# Patient Record
Sex: MALE | Race: WHITE | NOT HISPANIC OR LATINO | Employment: OTHER | URBAN - NONMETROPOLITAN AREA
[De-identification: names, ages, dates, MRNs, and addresses within clinical notes are randomized per-mention and may not be internally consistent; named-entity substitution may affect disease eponyms.]

---

## 2019-10-14 ENCOUNTER — HOSPITAL ENCOUNTER (OUTPATIENT)
Dept: GENERAL RADIOLOGY | Facility: HOSPITAL | Age: 59
Discharge: HOME | End: 2019-10-14
Admitting: INTERNAL MEDICINE

## 2019-10-14 ENCOUNTER — OFFICE VISIT (OUTPATIENT)
Dept: CARDIOLOGY | Facility: CLINIC | Age: 59
End: 2019-10-14

## 2019-10-14 ENCOUNTER — DOCUMENTATION (OUTPATIENT)
Dept: CARDIOLOGY | Facility: CLINIC | Age: 59
End: 2019-10-14

## 2019-10-14 VITALS
HEART RATE: 81 BPM | WEIGHT: 129.6 LBS | HEIGHT: 71 IN | SYSTOLIC BLOOD PRESSURE: 118 MMHG | OXYGEN SATURATION: 98 % | DIASTOLIC BLOOD PRESSURE: 78 MMHG | BODY MASS INDEX: 18.15 KG/M2

## 2019-10-14 DIAGNOSIS — R00.2 PALPITATIONS: ICD-10-CM

## 2019-10-14 DIAGNOSIS — R06.02 SHORTNESS OF BREATH: ICD-10-CM

## 2019-10-14 DIAGNOSIS — R55 SYNCOPE AND COLLAPSE: Primary | ICD-10-CM

## 2019-10-14 DIAGNOSIS — Z72.0 TOBACCO ABUSE: ICD-10-CM

## 2019-10-14 DIAGNOSIS — R07.2 PRECORDIAL PAIN: ICD-10-CM

## 2019-10-14 DIAGNOSIS — Z72.0 TOBACCO ABUSE: Primary | ICD-10-CM

## 2019-10-14 DIAGNOSIS — R93.89 ABNORMAL CXR: ICD-10-CM

## 2019-10-14 PROCEDURE — 99205 OFFICE O/P NEW HI 60 MIN: CPT | Performed by: INTERNAL MEDICINE

## 2019-10-14 PROCEDURE — 71046 X-RAY EXAM CHEST 2 VIEWS: CPT

## 2019-10-14 PROCEDURE — 93000 ELECTROCARDIOGRAM COMPLETE: CPT | Performed by: INTERNAL MEDICINE

## 2019-10-14 RX ORDER — CETIRIZINE HYDROCHLORIDE 10 MG/1
10 TABLET ORAL DAILY
Refills: 0 | COMMUNITY
Start: 2019-10-09

## 2019-10-14 RX ORDER — ALBUTEROL SULFATE 90 UG/1
AEROSOL, METERED RESPIRATORY (INHALATION)
Refills: 99 | COMMUNITY
Start: 2019-10-09

## 2019-10-14 RX ORDER — DEXAMETHASONE 4 MG/1
2 TABLET ORAL 2 TIMES DAILY
Refills: 0 | COMMUNITY
Start: 2019-09-06

## 2019-10-14 RX ORDER — BENZONATATE 100 MG/1
100 CAPSULE ORAL 3 TIMES DAILY PRN
Refills: 99 | COMMUNITY
Start: 2019-10-09 | End: 2020-11-11

## 2019-10-14 RX ORDER — METHOCARBAMOL 500 MG/1
TABLET, FILM COATED ORAL
Refills: 99 | COMMUNITY
Start: 2019-10-09

## 2019-10-14 NOTE — PROGRESS NOTES
[facility] Cardiology  OFFICE CONSULT NOTE    Patient Name: Vernon Nolen  Age/Sex: 59 y.o. male  : 1960  MRN: [id number]      Referring Provider: Viet Trevino MD  [address] [place] [address]        Chief Complaint: Syncope with collapse    History of Present Illness:  Vernon Nolen is a 59 y.o. male who has had multiple episodes of syncope.  The last one was his sister found him on the porch and he thought he probably had been out for 2 or 3 minutes.  He does not remember any prodrome and just said that he was slightly hot.  He had no chest pain at that time.  He has had other episodes when he has been out working.  He denies any seizure activity nor spinning in his head.  He said he would just go out and people would come up to him and he would wake up.  Sometimes it was more in the heat though this last time was not.  He has palpitations but did not feel them at this time.  He had no urine and or bowel incontinence or no seizure activity has been witnessed per his sister and his history.    He also has chest pain.  Describes as left-sided it is more of a dull pain.  It does not radiate.  It happens with exertion goes away with rest.  He has no nausea vomiting or diarrhea pheresis with it.  It only lasts for a couple seconds to up to a minute.  This pain does not necessarily go along with the syncopal episode.  He has baseline shortness of air that really does not get worse.    He does have palpitations but these are short and not necessarily related to his syncope.  They are not related to his chest pain either.  He does sleep on 2 pillows this is for neck comfort.        Last Echo: None    Last Cath: None    Past Medical History:  Past Medical History:   Diagnosis Date   • COPD (chronic obstructive pulmonary disease) (CMS/[facility])        PAST SURGERY History reviewed. No pertinent surgical history.    Home Medications:      (Not in a hospital admission)    Allergies:  Allergies    Allergen Reactions   • Penicillins Swelling        Social History:  Social History     Socioeconomic History   • Marital status:      Spouse name: Not on file   • Number of children: Not on file   • Years of education: Not on file   • Highest education level: Not on file   Tobacco Use   • Smoking status: Current Every Day Smoker     Packs/day: 0.50     Types: Cigarettes   • Smokeless tobacco: Never Used   Substance and Sexual Activity   • Alcohol use: No     Frequency: Never   • Drug use: Defer   • Sexual activity: Defer        Family History:  Family History   Problem Relation Age of Onset   • Heart disease Mother    • Heart disease Father    • Heart disease Sister          Review of Systems:   Constitution: No chills, no rigors, no unexplained weight loss or weight gain    Eyes:  No diplopia, no blurred vision, no loss of vision, conjunctiva is pink and sclera is anicteric    ENT:  No tinnitus, no otorrhea, no epistaxis, no sore throat   Respiratory: No cough, no hemoptysis    Cardiovascular:  As mentioned in HPI    Gastrointestinal: No nausea, no vomiting, no hematemesis, no diarrhea or constipation, no melena    Genitourinary: No frequency of dysuria no hematuria    Integument: positive for  No pruritis and  no skin rash    Hematologic / Lymphatic: No excessive bleeding, easy bruising, fatigue, lymphadenopathy and petechiae    Musculoskeletal: No joint pain, joint stiffness, joint swelling, muscle pain, muscle weakness and neck pain    Neurological: No dizziness, headaches, light headedness, seizures and vertigo or stroke    Behavioral/Psych: No depression, or bipolar disorder    Endocrine: no h/o diabetes, hyperlipidemia or thyroid problems        Vitals:    10/14/19 0828   BP: 118/78   Pulse: 81   SpO2: 98%       Body mass index is 18.08 kg/m².          Physical Exam:   General Appearance:    Alert, oriented, cooperative, in no acute distress     Head:    Normocephalic, atraumatic, without obvious  abnormality     Eyes:            Lids and lashes normal, conjunctivae and sclerae normal, no   icterus, no pallor     Ears:    Ears appear intact with no abnormalities noted     Throat:   Mucous membranes pink and moist     Neck:   Supple, trachea midline, no carotid bruit, no JVD     Lungs:     Air enty equal,  Clear to auscultation, respirations regular, Unlabored. No wheezes, rales, rhonchi    Heart:    Regular rhythm and normal rate, normal S1 and S2, no            murmur, no gallop,      Abdomen:     Normal bowel sounds, no masses, liver and spleen nonpalpable, soft, non-tender, non-distended, no guarding, no rebound tenderness       Extremities:   Moves all extremities well, no edema, no cyanosis, no              Redness, no clubbing     Pulses:   Pulses palpable and equal bilaterally       Neurologic:   Alert and oriented to person, place, and time. No focal neurological deficits       Lab Review:     No results found for any previous visit.   ]    Assessment and Plan  1 syncope and collapse  At this point we will go ahead and do orthostatic blood pressure heart rates and we will also plan on seeing event monitor.  We will do a echo to assess for LV systolic function.    2 chest pain    At this point we will order exercise treadmill test on the patient.  We will also obtain a lipid profile and get laboratories from his primary care physician.  An echo has also been ordered to look for any wall motion abnormalities.    3 palpitations    At this point we will go ahead and put a event monitor on him along with checking the above-mentioned echo.    4 shortness of air    At this point we will check echo at the primarily part if this is secondary to his COPD.  He is being evaluated for   Pulmonary.    5 tobacco abuse    Smoking cessation and counseling over 10 minutes was given.    She has abnormal chest x-ray.  CT of chest with and without contrast has been ordered.

## 2019-10-14 NOTE — PROGRESS NOTES
Orthostatic blood pressures   Laying 100/60  Sitting 110/72  Standing 118/78  Patient tolerated well

## 2019-10-15 ENCOUNTER — DOCUMENTATION (OUTPATIENT)
Dept: CARDIOLOGY | Facility: CLINIC | Age: 59
End: 2019-10-15

## 2019-10-15 ENCOUNTER — TELEPHONE (OUTPATIENT)
Dept: CARDIOLOGY | Facility: CLINIC | Age: 59
End: 2019-10-15

## 2019-10-16 ENCOUNTER — APPOINTMENT (OUTPATIENT)
Dept: CT IMAGING | Facility: HOSPITAL | Age: 59
End: 2019-10-16

## 2019-10-18 ENCOUNTER — HOSPITAL ENCOUNTER (OUTPATIENT)
Dept: CT IMAGING | Facility: HOSPITAL | Age: 59
End: 2019-10-18

## 2019-10-22 ENCOUNTER — APPOINTMENT (OUTPATIENT)
Dept: CT IMAGING | Facility: HOSPITAL | Age: 59
End: 2019-10-22

## 2019-10-23 ENCOUNTER — TELEPHONE (OUTPATIENT)
Dept: GENERAL RADIOLOGY | Facility: HOSPITAL | Age: 59
End: 2019-10-23

## 2019-10-23 ENCOUNTER — APPOINTMENT (OUTPATIENT)
Dept: CT IMAGING | Facility: HOSPITAL | Age: 59
End: 2019-10-23

## 2019-11-05 ENCOUNTER — TELEPHONE (OUTPATIENT)
Dept: CARDIOLOGY | Facility: CLINIC | Age: 59
End: 2019-11-05

## 2019-11-05 NOTE — TELEPHONE ENCOUNTER
Tried to call patient to give results of Holter. Number went straight to message saying patient is unavailable. No voice mail to leave message.

## 2020-06-10 ENCOUNTER — APPOINTMENT (OUTPATIENT)
Dept: ONCOLOGY | Facility: HOSPITAL | Age: 60
End: 2020-06-10

## 2020-06-10 ENCOUNTER — APPOINTMENT (OUTPATIENT)
Dept: ONCOLOGY | Facility: CLINIC | Age: 60
End: 2020-06-10

## 2020-06-12 ENCOUNTER — CONSULT (OUTPATIENT)
Dept: ONCOLOGY | Facility: CLINIC | Age: 60
End: 2020-06-12

## 2020-06-12 ENCOUNTER — LAB (OUTPATIENT)
Dept: ONCOLOGY | Facility: HOSPITAL | Age: 60
End: 2020-06-12

## 2020-06-12 ENCOUNTER — DOCUMENTATION (OUTPATIENT)
Dept: NUTRITION | Facility: HOSPITAL | Age: 60
End: 2020-06-12

## 2020-06-12 VITALS
WEIGHT: 129.2 LBS | DIASTOLIC BLOOD PRESSURE: 85 MMHG | HEART RATE: 91 BPM | TEMPERATURE: 98 F | BODY MASS INDEX: 18.09 KG/M2 | RESPIRATION RATE: 18 BRPM | SYSTOLIC BLOOD PRESSURE: 132 MMHG | HEIGHT: 71 IN

## 2020-06-12 DIAGNOSIS — Z85.828 HISTORY OF SKIN CANCER: ICD-10-CM

## 2020-06-12 DIAGNOSIS — C34.90 MALIGNANT NEOPLASM OF LUNG, UNSPECIFIED LATERALITY, UNSPECIFIED PART OF LUNG (HCC): ICD-10-CM

## 2020-06-12 DIAGNOSIS — R63.4 UNINTENTIONAL WEIGHT LOSS: ICD-10-CM

## 2020-06-12 DIAGNOSIS — C34.90 MALIGNANT NEOPLASM OF LUNG, UNSPECIFIED LATERALITY, UNSPECIFIED PART OF LUNG (HCC): Primary | ICD-10-CM

## 2020-06-12 DIAGNOSIS — J43.1 PANLOBULAR EMPHYSEMA (HCC): ICD-10-CM

## 2020-06-12 DIAGNOSIS — Z71.6 ENCOUNTER FOR TOBACCO USE CESSATION COUNSELING: ICD-10-CM

## 2020-06-12 DIAGNOSIS — Z74.8 ASSISTANCE WITH TRANSPORTATION: ICD-10-CM

## 2020-06-12 LAB
ALBUMIN SERPL-MCNC: 4.2 G/DL (ref 3.5–5.2)
ALBUMIN/GLOB SERPL: 1.7 G/DL
ALP SERPL-CCNC: 107 U/L (ref 39–117)
ALT SERPL W P-5'-P-CCNC: 10 U/L (ref 1–41)
ANION GAP SERPL CALCULATED.3IONS-SCNC: 6 MMOL/L (ref 5–15)
AST SERPL-CCNC: 13 U/L (ref 1–40)
BASOPHILS # BLD AUTO: 0.1 10*3/MM3 (ref 0–0.2)
BASOPHILS NFR BLD AUTO: 1.5 % (ref 0–1.5)
BILIRUB SERPL-MCNC: 0.4 MG/DL (ref 0.2–1.2)
BUN BLD-MCNC: 17 MG/DL (ref 8–23)
BUN/CREAT SERPL: 21.3 (ref 7–25)
CALCIUM SPEC-SCNC: 9.4 MG/DL (ref 8.6–10.5)
CHLORIDE SERPL-SCNC: 102 MMOL/L (ref 98–107)
CO2 SERPL-SCNC: 30 MMOL/L (ref 22–29)
CREAT BLD-MCNC: 0.8 MG/DL (ref 0.76–1.27)
DEPRECATED RDW RBC AUTO: 44.9 FL (ref 37–54)
EOSINOPHIL # BLD AUTO: 0 10*3/MM3 (ref 0–0.4)
EOSINOPHIL NFR BLD AUTO: 0 % (ref 0.3–6.2)
ERYTHROCYTE [DISTWIDTH] IN BLOOD BY AUTOMATED COUNT: 14.1 % (ref 12.3–15.4)
GFR SERPL CREATININE-BSD FRML MDRD: 98 ML/MIN/1.73
GLOBULIN UR ELPH-MCNC: 2.5 GM/DL
GLUCOSE BLD-MCNC: 76 MG/DL (ref 65–99)
HCT VFR BLD AUTO: 50.7 % (ref 37.5–51)
HGB BLD-MCNC: 17 G/DL (ref 13–17.7)
HOLD SPECIMEN: NORMAL
IMM GRANULOCYTES # BLD AUTO: 0.01 10*3/MM3 (ref 0–0.05)
IMM GRANULOCYTES NFR BLD AUTO: 0.2 % (ref 0–0.5)
LYMPHOCYTES # BLD AUTO: 1.91 10*3/MM3 (ref 0.7–3.1)
LYMPHOCYTES NFR BLD AUTO: 28.8 % (ref 19.6–45.3)
MCH RBC QN AUTO: 29 PG (ref 26.6–33)
MCHC RBC AUTO-ENTMCNC: 33.5 G/DL (ref 31.5–35.7)
MCV RBC AUTO: 86.5 FL (ref 79–97)
MONOCYTES # BLD AUTO: 0.47 10*3/MM3 (ref 0.1–0.9)
MONOCYTES NFR BLD AUTO: 7.1 % (ref 5–12)
NEUTROPHILS # BLD AUTO: 4.15 10*3/MM3 (ref 1.7–7)
NEUTROPHILS NFR BLD AUTO: 62.4 % (ref 42.7–76)
NRBC BLD AUTO-RTO: 0 /100 WBC (ref 0–0.2)
PLATELET # BLD AUTO: 231 10*3/MM3 (ref 140–450)
PMV BLD AUTO: 9.7 FL (ref 6–12)
POTASSIUM BLD-SCNC: 4.7 MMOL/L (ref 3.5–5.2)
PROT SERPL-MCNC: 6.7 G/DL (ref 6–8.5)
RBC # BLD AUTO: 5.86 10*6/MM3 (ref 4.14–5.8)
SODIUM BLD-SCNC: 138 MMOL/L (ref 136–145)
WBC NRBC COR # BLD: 6.64 10*3/MM3 (ref 3.4–10.8)

## 2020-06-12 PROCEDURE — 99406 BEHAV CHNG SMOKING 3-10 MIN: CPT | Performed by: INTERNAL MEDICINE

## 2020-06-12 PROCEDURE — 99205 OFFICE O/P NEW HI 60 MIN: CPT | Performed by: INTERNAL MEDICINE

## 2020-06-12 PROCEDURE — G0463 HOSPITAL OUTPT CLINIC VISIT: HCPCS | Performed by: INTERNAL MEDICINE

## 2020-06-12 PROCEDURE — 85025 COMPLETE CBC W/AUTO DIFF WBC: CPT

## 2020-06-12 PROCEDURE — 80053 COMPREHEN METABOLIC PANEL: CPT

## 2020-06-12 NOTE — PROGRESS NOTES
"Adult Outpatient Nutrition  Assessment    Patient Name:  Vernon Nolen  YOB: 1960  MRN: [id number]    Assessment Date:  6/12/2020    CHART REVIEW  Vernon Nolen  1960  59 y.o.  Wt: 129.2 lb  BMI: 18   Dx: lung cancer  Tx: work-up in progress  Labs: not avail     PATIENT/FAMILY COMMENTS  Usual Body Weight: 160's 5 years ago--stable for past year  Weight Comments: wants to gain  Diet: regular   Food Allergies: nkfa  Appetite: good  Supplements: willing to try  Meals: 1-2 + snacks  Food Preparation: pt + sister  Nutrition Concerns:  * problems chewing (lost dentures)  * low bmi  * \"borderling diabetic\" stated sister  * limited income    No problems with swallowing/nausea/vomiting/  constipation/diarrhea.      GOAL(s)  Optimize nutrition in attempt to prevent further loss of LBM      EDUCATION  -High calorie high protein soft moist diet  -Supplementation        * Provided samples of commercial supplements--recipes          for homemade supplements.  -Importance of staying hydrated      To reinforce education, written information with RD's name & number provided.  Pt and sister receptive to suggestions, and agreed to call on dietitian as needed.                          Electronically signed by:  Mallika Caldwell RD  06/12/20 12:00   "

## 2020-06-12 NOTE — PROGRESS NOTES
Vernon Nolen  [id number]  1960      REASON FOR CONSULTATION:  Lung cancer   Provide an opinion on any further workup or treatment                             REQUESTING PHYSICIAN:  Fuentes Davenport MD     RECORDS OBTAINED:  Records of the patients history including those obtained from the referring provider were reviewed and summarized in detail.    History of Present Illness     This is a pleasant 59-year-old male who was seen in consultation at the request of Dr Davenport for evaluation of newly diagnosed lung cancer.  Patient unfortunately is a poor historian and most of the history was obtained by reviewing old medical records.  In addition, I do not have some records available from his pulmonary physician's office.  I will try to obtain additional records to clarify.     Patient had a screening low-dose CT scan of chest was performed as a part of lung cancer screening which showed 1.8 cm left lower lobe mass located in periphery.  Patient subsequently had CT-guided biopsy performed on May 15, 2020 which showed adenocarcinoma.  Patient subsequently underwent PET scan on May 21, 2020.  I do not have actual images available however the radiologist reported to pulmonary nodules.  One was located in left lower lobe measuring 2 cm.  This is the area that was biopsied.  Another tiny small 8 mm nodule was also noted in the right lower lobe.  He was unclear this were 2 separate primaries versus metastatic disease.  No evidence of mediastinal adenopathy or distant metastasis.  Patient has chronic cough and shortness of breath but otherwise is asymptomatic.  His pulmonary physician did not feel that he was surgical candidate given poor lung function.  He was referred to me for further evaluation.    Past Medical History:   Diagnosis Date   • COPD (chronic obstructive pulmonary disease) (CMS/HCC)    • Kidney stones    • Lung cancer (CMS/HCC)         Past Surgical History:   Procedure Laterality Date   • BRONCHOSCOPY           Current Outpatient Medications on File Prior to Visit   Medication Sig Dispense Refill   • albuterol sulfate  (90 Base) MCG/ACT inhaler INHALE 1-2 PUFFS BY MOUTH EVERY FOUR TO SIX HOURS AS NEEDED FOR COPD  99   • benzonatate (TESSALON) 100 MG capsule Take 100 mg by mouth 3 (Three) Times a Day As Needed.  99   • cetirizine (zyrTEC) 10 MG tablet Take 10 mg by mouth Daily.  0   • FLOVENT  MCG/ACT inhaler Inhale 2 puffs 2 (Two) Times a Day.  0   • methocarbamol (ROBAXIN) 500 MG tablet TAKE 1 TO 2 TABLETS BY MOUTH THREE TIMES DAILY AS NEEDED  99     No current facility-administered medications on file prior to visit.         ALLERGIES:    Allergies   Allergen Reactions   • Penicillins Swelling        Social History     Socioeconomic History   • Marital status:      Spouse name: Not on file   • Number of children: Not on file   • Years of education: Not on file   • Highest education level: Not on file   Tobacco Use   • Smoking status: Current Every Day Smoker     Packs/day: 0.50     Types: Cigarettes   • Smokeless tobacco: Never Used   • Tobacco comment: trying to quit   Substance and Sexual Activity   • Alcohol use: No     Frequency: Never   • Drug use: Defer   • Sexual activity: Defer        Family History   Problem Relation Age of Onset   • Heart disease Mother    • Heart disease Father    • Prostate cancer Father    • Heart disease Sister    • Prostate cancer Brother    • Lymphoma Maternal Aunt    • Lymphoma Maternal Uncle    • Breast cancer Paternal Aunt    • Brain cancer Paternal Aunt    • Colon cancer Paternal Grandfather         Review of Systems       CONSTITUTIONAL: fatigue + unintentional weight loss - 20 lbs over a year. No  fever, chills, weakness.  HEENT: Eyes: No visual loss, blurred vision, double vision or yellow sclerae. Ears, Nose, Throat: No hearing loss, sneezing, congestion, runny nose or sore throat.  SKIN: No rash or itching.  CARDIOVASCULAR: No chest pain, chest pressure  "or chest discomfort. No palpitations or edema.  RESPIRATORY: Chronic cough and shortness of breath +   GASTROINTESTINAL: No anorexia, nausea, vomiting or diarrhea. No abdominal pain or blood.  GENITOURINARY: Negative for urgency, frequency or dysuria.   NEUROLOGICAL: No headache, dizziness, syncope, paralysis, ataxia, numbness or tingling in the extremities. No change in bowel or bladder control.  MUSCULOSKELETAL: No muscle, back pain, joint pain or stiffness.  HEMATOLOGIC: No anemia, bleeding or bruising.  LYMPHATICS: No enlarged nodes. No history of splenectomy.  PSYCHIATRIC: No history of depression or anxiety.  ENDOCRINOLOGIC: No reports of sweating, cold or heat intolerance. No polyuria or polydipsia.  ALLERGIES: No history of asthma, hives, eczema or rhinitis.      Objective     Vitals:    06/12/20 0914 06/12/20 1237   BP: 132/85    Pulse: 91    Resp: 18    Temp: 98 °F (36.7 °C)    Weight: 58.6 kg (129 lb 3.2 oz)    Height: 180.3 cm (71\")    PainSc: Comment: uncomfortable, no pain 0-No pain     Current Status 6/12/2020   ECOG score 0       Physical Exam      GENERAL: Alert, awake, oriented.  Well dressed.  Not in apparent distress. Vitals as above.   HEAD: Normocephalic, atraumatic.   EYES: PERRL, EOMI.  vision is grossly intact.  NECK: Supple, no adenopathy or thyromegaly.   THROAT: Normal oral cavity and pharynx. No inflammation, swelling, exudate, or lesions.  CARDIAC: Normal S1 and S2. No S3, S4 or murmurs. Rhythm is regular.  Extremities are warm and well perfused.   LUNGS: Clear to auscultation and percussion without rales, rhonchi, wheezing or diminished breath sounds.  ABDOMEN: Positive bowel sounds. Soft, nondistended, nontender. No guarding or rebound. No masses.  BACK:  No bony tenderness.   EXTREMITIES: No significant deformity or joint abnormality.  Peripheral pulses intact. No varicosities.  SKIN: No rash or bruising.  NEUROLOGICAL: Grossly non-focal exam. No focal weakness. Gait: Normal. "   PSYCH: Mood and affect normal. No hallucination or suicidal thoughts.   LYMPHATIC: No cervical, supraclavicular or axillary adenopathy.       RECENT LABS:Independently reviewed and summarized.  Hematology WBC   Date Value Ref Range Status   06/12/2020 6.64 3.40 - 10.80 10*3/mm3 Final     RBC   Date Value Ref Range Status   06/12/2020 5.86 (H) 4.14 - 5.80 10*6/mm3 Final     Hemoglobin   Date Value Ref Range Status   06/12/2020 17.0 13.0 - 17.7 g/dL Final     Hematocrit   Date Value Ref Range Status   06/12/2020 50.7 37.5 - 51.0 % Final     Platelets   Date Value Ref Range Status   06/12/2020 231 140 - 450 10*3/mm3 Final        Imaging (independently reviewed and summarized):   Result of PET scan from May 21, 20/20 reviewed.  These show FDG avid nodules in both lower lobes.  The largest nodule measured 2 cm in maximum diameter in the left lower lobe.  In addition there was a smaller nodule in the right lower lobe measuring 8 mm.  No evidence of mediastinal or other adenopathy.  No evidence of distant metastasis.  No prior comparison available.    Pathology (result reviewed):   Left lung mass , CT guided biopsy (5/15/20)  Malignant cells consistent with adenocarcinoma       I have reviewed old records and summarized them in HPI as well as assessment and plan section of this note.     In addition, I have requested old PFT records from his pulmonary physician's office.       Vernon Nolen reports a pain score of 0.  Given his pain assessment as noted, treatment options were discussed and the following options were decided upon as a follow-up plan to address the patient's pain: continuation of current treatment plan for pain.    PHQ-9 Total Score: 0  Depression screen negative.     Advance Care Planning   ACP discussion was declined by the patient. Patient does not have an advance directive, declines further assistance.      Diagnosis:   (1) Adenocarcinoma of lung, left lower lobe   (2) Left lower lobe pulmonary nodule    (3) COPD   (4) History of skin cancer  (5) Unintentional weight loss  (6) Encounter for tobacco use cessation counseling     All are new diagnosis for me.   In addition diagnosis of lung cancer potentially life threatening.     Assessment/Plan     (1) Adenocarcinoma of lung, left lower lobe (2) Left lower lobe pulmonary nodule     Discussed diagnosis, prognosis and treatment options at length with patient and family.  He has adenocarcinoma involving left lung measuring 2 cm in maximum diameter.  I do not have actual PET scan images available however radiologist report did not show any concern for metastatic disease.  He did have separate small 8 mm nodule in the right lower lobe.  This could be infectious versus inflammatory or malignant in nature.  It is possible that this is separate primary versus the metastatic disease from the left lung.    I have requested pulmonary function test result from his pulmonary physician's office.  As per him due to his advanced COPD he was not thought to be surgical candidate.  He is otherwise healthy 59-year-old male and I believe could potentially benefit from SBRT treatment to at least left lung nodule which is measuring 2 cm.    I will send referral to [place] to see 1 of the radiation oncologist down there.      (3) COPD: Follows with pulmonary.  Counseled about smoking cessation. On breo and albuterol.     (4) History of skin cancer: Patient is reporting couple of new spots on the skin.  I will arrange referral to dermatology to have them looked at.  He does have history of couple of squamous cell carcinoma of skin that was removed surgically.    (5) Unintentional weight loss: Nutrition consult.  High-calorie high-protein diet.    (6) Encounter for tobacco use cessation counseling: I had 5-7 minutes discussion with the patient about smoking cessation. Problems with continued smoking including respiratory, cardiovascular and oncological problems were discussion. Advice on  smoking cessation was reiterated including methods of quitting and different medications and support system available for smoking cessation was discussed.  Patient understood and was agreeable.    (7) Assistance with transportation:  consult.     I have spent > 60 minutes times face to face with the patient and more than 50% of time was spent on counseling/coordinating care.     Thank you for involving me in Mr Nolen's care.     Please call us if any questions/concerns.     Daniel Guevara MD   Hematology Oncology

## 2020-06-16 ENCOUNTER — TELEPHONE (OUTPATIENT)
Dept: ONCOLOGY | Facility: CLINIC | Age: 60
End: 2020-06-16

## 2020-06-16 NOTE — TELEPHONE ENCOUNTER
MARTITA FROM [facility] CALLED TO LET THE OFFICE KNOW THEY DO NOT TAKE THE PT'S INSURANCE , SHE SAID THAT SHE BELIEVES  DR MICHAEL JUDD IN [place] TAKES MEDICAID IF YOU WANTED TO REFER THE PT THERE

## 2020-06-18 ENCOUNTER — TELEPHONE (OUTPATIENT)
Dept: ONCOLOGY | Facility: CLINIC | Age: 60
End: 2020-06-18

## 2020-06-18 ENCOUNTER — HOSPITAL ENCOUNTER (OUTPATIENT)
Dept: RADIATION ONCOLOGY | Facility: HOSPITAL | Age: 60
Setting detail: RADIATION/ONCOLOGY SERIES
End: 2020-06-18

## 2020-06-18 PROBLEM — F17.200 CURRENT EVERY DAY SMOKER: Status: ACTIVE | Noted: 2020-06-18

## 2020-06-18 NOTE — PROGRESS NOTES
[facility]  MD Nicci Black BSN, PA-C  ____________________________________________________________               [facility]  Department of Radiation Oncology  [address] [place] [id number]  Office:  [phone]  Fax: [phone]    DATE:  06/19/2020  PATIENT: Vernon Nolen  1960                                 MEDICAL RECORD #: [id number]                                                       REASON FOR CONSULTATION:  Vernon Nolen is a very pleasant 60 y.o. male that has been referred to our clinic by Josr Guevara, to discuss radiotherapy recommendations for carcinoma of the lung. Reports appetite change, fatigue, unexpected weight change, seasonal allergies, SOB, wheezing, upper back/shoulder pain, and headaches. Denies cough, chest tightness, stridor, nausea/vomiting, diarrhea, light-headedness, weakness, dizziness, seizures, and speech difficulty. He follows Daniel Guevara MD with Hematology/Oncology.            HISTORY OF PRESENT ILLNESS  Patient had a screening low-dose CT scan of chest that was performed as a part of lung cancer screening which showed a 1.8 cm left lower lobe mass located in periphery.  Biopsy was completed.     05/07/2020 - Left lung, mass, CT guided biopsy:  · Malignant cells are present consistent with adenocarcinoma.     05/21/2020 - PET Scan:  · FDG-avid nodules in both lower lobes, the larger on the left. The nodule at the left lung base is consistent with the known history of lung carcinoma.   · The nodule at the right lung base likely represents a small metastatic focus.   · A second primary lesion could have a similar appearance but would be less likely. Correlation and continued follow-up are recommended.     06/12/2020 - Appointment with Daniel Guevara MD (Hematology Oncology):  Adenocarcinoma of lung, left lower lobe (2) Left lower lobe pulmonary nodule   · Discussed diagnosis, prognosis and treatment  options at length with patient and family.  · He has adenocarcinoma involving left lung measuring 2 cm in maximum diameter.  · I do not have actual PET scan images available however radiologist report did not show any concern for metastatic disease.  · He did have separate small 8 mm nodule in the right lower lobe.  · This could be infectious versus inflammatory or malignant in nature.  · It is possible that this is separate primary versus the metastatic disease from the left lung.  · I have requested pulmonary function test result from his pulmonary physician's office.  · As per him due to his advanced COPD he was not thought to be surgical candidate.  · He is otherwise healthy 59-year-old male and I believe could potentially benefit from SBRT treatment to at least left lung nodule which is measuring 2 cm.  · I will send referral to [place] to see 1 of the radiation oncologist down there.       History obtained from  PATIENT and CHART    PAST MEDICAL HISTORY  Past Medical History:   Diagnosis Date   • COPD (chronic obstructive pulmonary disease) (CMS/HCC)    • Kidney stones    • Lung cancer (CMS/HCC)       PAST SURGICAL HISTORY  Past Surgical History:   Procedure Laterality Date   • BRONCHOSCOPY        FAMILY HISTORY  family history includes Brain cancer in his paternal aunt; Breast cancer in his paternal aunt; Colon cancer in his paternal grandfather; Heart disease in his father, mother, and sister; Lymphoma in his maternal aunt and maternal uncle; Prostate cancer in his brother and father.     SOCIAL HISTORY  Social History     Tobacco Use   • Smoking status: Current Every Day Smoker     Packs/day: 0.50     Years: 53.00     Pack years: 26.50     Types: Cigarettes   • Smokeless tobacco: Never Used   • Tobacco comment: trying to quit   Substance Use Topics   • Alcohol use: No     Frequency: Never   • Drug use: Defer      ALLERGIES  Penicillins     MEDICATIONS  Current Outpatient Medications   Medication Sig  Dispense Refill   • albuterol sulfate  (90 Base) MCG/ACT inhaler INHALE 1-2 PUFFS BY MOUTH EVERY FOUR TO SIX HOURS AS NEEDED FOR COPD  99   • benzonatate (TESSALON) 100 MG capsule Take 100 mg by mouth 3 (Three) Times a Day As Needed.  99   • cetirizine (zyrTEC) 10 MG tablet Take 10 mg by mouth Daily.  0   • FLOVENT  MCG/ACT inhaler Inhale 2 puffs 2 (Two) Times a Day.  0   • Fluticasone Furoate-Vilanterol (Breo Ellipta) 200-25 MCG/INH inhaler Inhale 1 puff Daily.     • methocarbamol (ROBAXIN) 500 MG tablet TAKE 1 TO 2 TABLETS BY MOUTH THREE TIMES DAILY AS NEEDED  99   • Nebulizers (INNOSPIRE ESSENCE NEBULIZER) misc Inhale 4 (Four) Times a Day.     • Tiotropium Bromide Monohydrate (Spiriva Respimat) 1.25 MCG/ACT aerosol solution inhaler Inhale 1 puff Daily.       No current facility-administered medications for this visit.       Current outpatient and discharge medications have been reconciled for the patient.  Reviewed by: Fly Chilel III, MD    The following portions of the patient's history were reviewed and updated as appropriate: allergies, current medications, past family history, past medical history, past social history, past surgical history and problem list.    REVIEW OF SYSTEMS  Review of Systems   Constitutional: Positive for appetite change, fatigue and unexpected weight change. Negative for chills, diaphoresis and fever.   HENT:        Seasonal allergies   Eyes: Negative.    Respiratory: Positive for shortness of breath and wheezing. Negative for apnea, cough, choking, chest tightness and stridor.    Cardiovascular: Negative.    Gastrointestinal: Negative.    Endocrine: Negative.    Genitourinary: Negative.    Musculoskeletal: Negative.         Upper back/shoulders pain   Skin: Negative.    Allergic/Immunologic: Negative.    Neurological: Positive for headaches. Negative for dizziness, tremors, seizures, syncope, facial asymmetry, speech difficulty, weakness, light-headedness and  "numbness.   Hematological: Negative.    Psychiatric/Behavioral: Negative.      PHYSICAL EXAM  VITAL SIGNS:   Vitals:    06/19/20 1234   BP: 118/72   Pulse: 100   Resp: 20   SpO2: 96%   Weight: 58.5 kg (129 lb)   Height: 180.3 cm (71\")   PainSc:   6   PainLoc: Back      General:  Alert and oriented, no acute distress, well developed, Vitals reviewed.  Head:  Normocephalic, without obvious abnormality    Nose/Sinuses:  Nares normal externally, no sinus tenderness.  Mouth/Throat:  Mucosa moist, pharynx without erythema  Neck:  supple, No evidence of adenopathy in the cervical or supraclavicular areas.  Eyes: No gross abnormalities   Ears: Ears intact with no external abnormalities noted  Chest:  Respiratory efforts are normal and unlabored, chest is clear to auscultation.  Cardiovascular: Regular rate and rhythm without murmurs, rubs, or gallops.   Abdomen:  Soft, non-tender, normal bowel sounds; no CVA tenderness   Extremities:  MARQUEZ well, warm to touch, no evidence of cyanosis or edema.  Skin: No suspicious lesions or rashes of concern  Neurologic:  Alert and oriented, non focal exam, strength and sensation grossly normal  Psych: Mood and affect are appropriate    Performance Status: ECOG (1) Restricted in physically strenuous activity, ambulatory and able to do work of light nature    Clinical Quality Measures  -Pain Documented by Standardized Tool, FPS Vernon Nolen reports a pain score of 6. Given his pain assessment as noted, treatment options were discussed and the following options were decided upon as a follow-up plan to address the patient's pain: continuation of current treatment plan for pain and use of non-medical modalities (ice, heat, stretching and/or behavior modifications).    Pain Medications             methocarbamol (ROBAXIN) 500 MG tablet TAKE 1 TO 2 TABLETS BY MOUTH THREE TIMES DAILY AS NEEDED        -Advanced Care Planning Advance Care Planning   ACP discussion was held with the patient during this " visit. Patient does not have an advance directive, information provided.    -Body Mass Index Screening and Follow-Up Plan Patient's Body mass index is 17.99 kg/m². BMI is below normal parameters. Recommendations include: treating the underlying disease process.  -Tobacco Use: Screening and Cessation Intervention Social History    Tobacco Use      Smoking status: Current Every Day Smoker        Packs/day: 0.50        Years: 53.00        Pack years: 26.5        Types: Cigarettes      Smokeless tobacco: Never Used      Tobacco comment: trying to quit   Smoking cessation information given in after visit summary.    ASSESSMENT AND PLAN  1. Malignant neoplasm of lung, unspecified laterality, unspecified part of lung (CMS/HCC)    2. History of skin cancer    3. Current every day smoker      RECOMMENDATIONS: Vernon Nolen has been referred to our office today to discuss radiotherapy recommendations for carcinoma of the lung.     The indications and rationale of radiation therapy according to the NCCN Guidelines to the lung has been discussed today. I have extensively reviewed the risks, benefits and alternatives of therapy with this diagnosis. The risks of radiation therapy includes but is not limited to radiation induced pulmonary fibrosis, progression of disease in spite of therapy with either local or systemic failure. I have seen, examined and reviewed this patient's medication list, appropriate labs and imaging studies as well as other physician notes.  Pulmonary function tests are being obtained from his pulmonary physician's office.    We discussed the goals and plans of care with the patient and family and answered all questions. Following this discussion and in consideration of the diagnostic data/evaluation of the patient, I am recommending SBRT to the left lower lobe and right lower lobe of the lung. We will simulate treatment fields today to begin the treatment planning, I anticipate a dose of around 5000 cGy over  3-5 fractions, with final course to be determined after treatment planning.     Vernon Nolen  reports that he has been smoking cigarettes. He has a 26.50 pack-year smoking history. He has never used smokeless tobacco.. I have educated him on the risk of diseases from using tobacco products such as cancer, COPD and heart diease. I advised him to quit and he is not willing to quit. I spent >10 minutes counseling the patient.    Thank you for allowing me to assist in this patients care.     Todays appointment time was spent in counseling and coordination of care as follows: diagnosis, intent of treatment discussing radiation therapy specifics: logistics, possible and probable side effects and after effects, staging of cancer, standard of care in for this stage of this cancer and treatment options  Fly Chilel III, MD  06/19/2020

## 2020-06-18 NOTE — TELEPHONE ENCOUNTER
Patient is going to [place] for radiation and was wanting to know if patient can get transportation        Call patient back at [phone]

## 2020-06-19 ENCOUNTER — CONSULT (OUTPATIENT)
Dept: RADIATION ONCOLOGY | Facility: HOSPITAL | Age: 60
End: 2020-06-19

## 2020-06-19 ENCOUNTER — DOCUMENTATION (OUTPATIENT)
Dept: RADIATION ONCOLOGY | Facility: HOSPITAL | Age: 60
End: 2020-06-19

## 2020-06-19 ENCOUNTER — APPOINTMENT (OUTPATIENT)
Dept: RADIATION ONCOLOGY | Facility: HOSPITAL | Age: 60
End: 2020-06-19

## 2020-06-19 VITALS
WEIGHT: 129 LBS | HEIGHT: 71 IN | RESPIRATION RATE: 20 BRPM | DIASTOLIC BLOOD PRESSURE: 72 MMHG | BODY MASS INDEX: 18.06 KG/M2 | SYSTOLIC BLOOD PRESSURE: 118 MMHG | OXYGEN SATURATION: 96 % | HEART RATE: 100 BPM

## 2020-06-19 DIAGNOSIS — F17.200 CURRENT EVERY DAY SMOKER: ICD-10-CM

## 2020-06-19 DIAGNOSIS — Z85.828 HISTORY OF SKIN CANCER: ICD-10-CM

## 2020-06-19 DIAGNOSIS — C34.90 MALIGNANT NEOPLASM OF LUNG, UNSPECIFIED LATERALITY, UNSPECIFIED PART OF LUNG (HCC): Primary | ICD-10-CM

## 2020-06-19 PROCEDURE — 77334 RADIATION TREATMENT AID(S): CPT | Performed by: RADIOLOGY

## 2020-06-19 RX ORDER — NEBULIZER AND COMPRESSOR
EACH MISCELLANEOUS 4 TIMES DAILY
COMMUNITY

## 2020-06-19 NOTE — PATIENT INSTRUCTIONS
Lung Cancer  Lung cancer is an abnormal growth of cancerous cells that forms a mass (malignant tumor) in a lung. There are several types of lung cancer. The types are based on the appearance of the tumor cells. The two most common types are:  · Non-small cell lung cancer. This type of lung cancer is the most common type. Non-small cell lung cancers include squamous cell carcinoma, adenocarcinoma, and large cell carcinoma.  · Small cell lung cancer. In this type of lung cancer, abnormal cells are smaller than those of non-small cell lung cancer. Small cell lung cancer gets worse (progresses) faster than non-small cell lung cancer.  What are the causes?  The most common cause of lung cancer is smoking tobacco. The second most common cause is exposure to a chemical called radon.  What increases the risk?  You are more likely to develop this condition if:  · You smoke tobacco.  · You have been exposed to:  ? Secondhand tobacco smoke.  ? Radon gas.  ? Uranium.  ? Asbestos.  ? Arsenic in drinking water.  ? Air pollution.  · You have a family or personal history of lung cancer.  · You have had lung radiation therapy in the past.  · You are older than age 65.  What are the signs or symptoms?  In the early stages, you may not have any symptoms. As the cancer progresses, symptoms may include:  · A lasting cough, possibly with blood.  · Fatigue.  · Unexplained weight loss.  · Shortness of breath.  · Loud breathing (wheezing).  · Chest pain.  · Loss of appetite.  Symptoms of advanced lung cancer include:  · Hoarseness.  · Bone or joint pain.  · Weakness.  · Change in the structure of the fingernails (clubbing), so that the nail looks like an upside-down spoon.  · Swelling of the face or arms.  · Inability to move the face (paralysis).  · Drooping eyelids.  How is this diagnosed?  This condition may be diagnosed based on:  · Your symptoms and medical history.  · A physical exam.  · A chest X-ray.  · A CT scan.  · Blood  tests.  · Sputum tests.  · Removal of a sample of lung tissue (lung biopsy) for testing.  Your cancer will be assessed (staged) to determine how severe it is and how much it has spread (metastasized).  How is this treated?  Treatment depends on the type and stage of your cancer. Treatment may include one or more of the following:  · Surgery to remove as much of the cancer as possible. Lymph nodes in the area may be removed and tested for cancer as well.  · Medicines that kill cancer cells (chemotherapy).  · High-energy rays that kill cancer cells (radiation therapy).  · Chemotherapy. This treatment uses medicines to destroy cancer cells.  · Targeted therapy. This targets specific parts of cancer cells and the area around them to block the growth and spread of the cancer. Targeted therapy can help limit the damage to healthy cells.  Follow these instructions at home:  Eating and drinking  · Some of your treatments might affect your appetite. If you are having problems eating, or if you do not have an appetite, meet with a dietitian.  · If you have side effects that affect your appetite, it may help to:  ? Eat smaller meals and snacks often.  ? Drink high-nutrition and high-calorie shakes or supplements.  ? Eat bland and soft foods that are easy to eat.  ? Avoid eating foods that are hot, spicy, or hard to swallow.  General instructions    · Do not use any products that contain nicotine or tobacco, such as cigarettes and e-cigarettes. If you need help quitting, ask your health care provider.  · Do not drink alcohol.  · If you are admitted to the hospital, make sure your cancer specialist (oncologist) is aware. Your cancer may affect your treatment for other conditions.  · Take over-the-counter and prescription medicines only as told by your health care provider.  · Consider joining a support group for people who have been diagnosed with lung cancer.  · Work with your health care provider to manage any side effects of  treatment.  · Keep all follow-up visits as told by your health care provider. This is important.  Where to find more information  · American Cancer Society: https://www.cancer.org  · National Cancer Micro (NCI): https://www.cancer.gov  Contact a health care provider if you:  · Lose weight without trying.  · Have a persistent cough and wheezing.  · Feel short of breath.  · Get tired easily.  · Have bone or joint pain.  · Have difficulty swallowing.  · Notice that your voice is changing or getting hoarse.  · Have pain that does not get better with medicine.  Get help right away if you:  · Cough up blood.  · Have new breathing problems.  · Have chest pain.  · Have a fever.  · Have swelling in an ankle, leg, or arm, or the face or neck.  · Have paralysis in your face.  · Are very confused.  · Have a drooping eyelid.  Summary  · Lung cancer is an abnormal growth of cancerous cells that forms a mass (malignant tumor) in a lung.  · There are several types of lung cancer. The types are based on the appearance of the tumor cells. The two most common types are non-small cell and small cell.  · The most common cause of lung cancer is smoking tobacco.  · Early symptoms include a lasting cough, possibly with blood, and fatigue, unexplained weight loss, and shortness of breath.  · After diagnosis, treatment depends on the type and stage of your cancer.  This information is not intended to replace advice given to you by your health care provider. Make sure you discuss any questions you have with your health care provider.  Document Released: 03/26/2002 Document Revised: 11/30/2018 Document Reviewed: 10/25/2018  [employer] Patient Education © 2020 [employer]    External Beam Radiation Therapy  External beam radiation therapy is a type of radiation treatment. This type of radiation therapy can deliver radiation to a fairly large area. This is the most common type of radiation therapy for cancer. This therapy may be done  to:  · Treat cancer by:  ? Destroying cancer cells. Radiation delivered during the treatment damages cancer cells. It may also damage normal cells, but normal cells have the DNA to repair themselves while cancer cells do not.  ? Helping with symptoms of your cancer.  ? Stopping the growth of any remaining cancer cells after surgery.  ? Preventing cancer cells from growing in areas that do not have cancer (prophylactic radiation therapy).  · Treat or shrink a tumor.  · Reduce pain (palliative therapy).  The amount of radiation you will receive and the length of therapy depend on your medical condition. You should not feel the radiation being delivered or any pain during your therapy.  Let your health care provider know about:  · Any allergies you have.  · All medicines you are taking, including vitamins, herbs, eye drops, creams, and over-the-counter medicines.  · Any problems you or family members have had with anesthetic medicines.  · Any blood disorders you have.  · Any surgeries you have had.  · Any medical conditions you have.  · Whether you are pregnant or may be pregnant.  What are the risks?  Generally, this is a safe procedure. However, radiation therapy can place a person at a higher risk for a second cancer later in life.  Most people experience side effects from the therapy. Side effects depend on the amount of radiation and the part of the body that was exposed to radiation. The most common side effects include:  · Skin changes.  · Hair loss.  · Fatigue.  · Nausea and vomiting.  What happens before the procedure?  · There will be a planning session (simulation). During the session:  ? Your health care provider will plan exactly where the radiation will be delivered (treatment field).  ? You will be positioned for your therapy. The goal is to have a position that can be reproduced for each therapy session.  ? Temporary marks may be drawn on your body. Permanent marks may also be drawn on your body in  order for you to be positioned the same way for each therapy session.  ? A tool that holds a body part in place (immobilization device) may be used to keep the area of treatment in the correct position.  · Follow instructions from your health care provider about eating or drinking restrictions.  · Ask your health care provider about changing or stopping your regular medicines. This is especially important if you are taking diabetes medicines or blood thinners.  What happens during the procedure?    · You will either lie on a table or sit in a chair in the position determined for your therapy.  · You may have a heavy shield placed on you to protect tissues and organs that are not being treated.  · The radiation machine (linear accelerator) will move around you to deliver the radiation in exact doses from different angles. The machine will not touch you.  The procedure may vary among health care providers and hospitals.  What happens after the procedure?  · You may return to your normal routine including diet, activities, and medicines as told by your health care provider.  · You may want to have someone take you home from the hospital or clinic.  Summary  · External beam radiation therapy is a type of radiation treatment for cancer.  · The amount of radiation you will receive and the length of therapy depend on your medical condition.  · Most people experience side effects from the therapy. Side effects depend on the amount of radiation and the part of the body that was exposed to radiation.  This information is not intended to replace advice given to you by your health care provider. Make sure you discuss any questions you have with your health care provider.  Document Released: 05/06/2010 Document Revised: 12/19/2018 Document Reviewed: 12/18/2017  [employer] Patient Education © 2020 [employer] Inc.

## 2020-06-19 NOTE — PROGRESS NOTES
Mr. Nolen is here for a radiation consultation and ROZ met with him due to his distress score. Mr. Nolen is a 60 year old male. He lives with his sister and her boyfriend. He said he is sleeping well and does not eat much. Mr. Nolen said he has been drinking boost. ROZ provided him with a few bottles of boost. Mr. Nolen said he was nervous before his consultation but after speaking to the doctor he is more relaxed about treatment. He said his main concern will be transportation. He lives 100 miles away from [place]. They do not have a car and had to borrow a friend’s car to come to the consultation. He filled out a presumptive eligibility form and does qualify for assistance. Mr. Nolen was provided a gas card for today. He has [place] [employer]. ROZ called [facility] to see if he would qualify for transportation under his Medicaid. ROZ left message due to [facility] not answering. ROZ will follow up with [facility] to assist with transportation.

## 2020-06-22 ENCOUNTER — DOCUMENTATION (OUTPATIENT)
Dept: RADIATION ONCOLOGY | Facility: HOSPITAL | Age: 60
End: 2020-06-22

## 2020-06-22 NOTE — PROGRESS NOTES
Spoke to rep from [employer], Dali, today via phone. Dali states she will begin to work on getting transportation for Mr. Nolen approved through Medicaid. Informed Dali that we do not have a start date for Mr. Nolen and she said that’s ok they can begin the process. [facility] will need three day notice to get transportation arranged. [facility] number is [phone]. SW will remain available.

## 2020-06-23 ENCOUNTER — TELEPHONE (OUTPATIENT)
Dept: ONCOLOGY | Facility: CLINIC | Age: 60
End: 2020-06-23

## 2020-06-23 NOTE — TELEPHONE ENCOUNTER
Oncology SW attempted to contact pt by phone in follow up to 6-12-20 consultation with medical oncology,  Dr. Guevara.   LCSW inquiry as to pt needs, coping and support.  No answer, SW requested call back.

## 2020-06-25 ENCOUNTER — DOCUMENTATION (OUTPATIENT)
Dept: ONCOLOGY | Facility: CLINIC | Age: 60
End: 2020-06-25

## 2020-06-25 NOTE — PROGRESS NOTES
Oncology ROZ spoke with Dali at [facility] ([phone]). She states that pt transport for radiation at [place] has been authorized and coordinating with SW at that facility. She states that pt is denied transport at this time for [place]. ROZ explained pt scheduled to follow up in September with medical oncology, upon completion of radiation. ROZ . Advised to follow up closer to that apt.in regard to verification of services and need.

## 2020-06-29 PROCEDURE — 77293 RESPIRATOR MOTION MGMT SIMUL: CPT | Performed by: RADIOLOGY

## 2020-06-29 PROCEDURE — 77300 RADIATION THERAPY DOSE PLAN: CPT | Performed by: RADIOLOGY

## 2020-06-29 PROCEDURE — 77338 DESIGN MLC DEVICE FOR IMRT: CPT | Performed by: RADIOLOGY

## 2020-06-29 PROCEDURE — 77301 RADIOTHERAPY DOSE PLAN IMRT: CPT | Performed by: RADIOLOGY

## 2020-07-01 ENCOUNTER — HOSPITAL ENCOUNTER (OUTPATIENT)
Dept: RADIATION ONCOLOGY | Facility: HOSPITAL | Age: 60
Setting detail: RADIATION/ONCOLOGY SERIES
End: 2020-07-01

## 2020-07-17 ENCOUNTER — TELEPHONE (OUTPATIENT)
Dept: RADIATION ONCOLOGY | Facility: HOSPITAL | Age: 60
End: 2020-07-17

## 2020-07-17 NOTE — TELEPHONE ENCOUNTER
ROZ set up transportation for Mr. Nolen’s radiation appointment on 7-21-20 at 2:00PM with [facility]. Spoke to repHill, who provided confirmation number of [id number]. Informed [place] this was a round trip and Mr. Nolen’s sister would be traveling with him. ROZ also spoke to [employer] dispatcher repKathya, who states they will pick Mr. Nolen up between 11:30AM-12:00PM. Kathya states the  will wait for Mr. Nolen to finish his appointment due to it being far away. Informed Kathya that Mr. Nolen will have three more treatments. [facility] dispatcher number is [phone]. Mr. Nolen is aware of his appointment but ROZ left a message informing him of his pickup time. ROZ will remain available.

## 2020-07-20 ENCOUNTER — TELEPHONE (OUTPATIENT)
Dept: RADIATION ONCOLOGY | Facility: HOSPITAL | Age: 60
End: 2020-07-20

## 2020-07-20 NOTE — TELEPHONE ENCOUNTER
ROZ set up transportation for Mr. Nolen’s appointments on 7-28-20, 8-3-20, and 8-5-20 through [facility]. Spoke to Eunice at [employer] and informed her each trip was a roundtrip, his sister would be going with him, and all three appointments times are 3:00P.M.   Confirmation number for his 7-28-20 appointment is [id number]  Confirmation number for his 8-3-20 appointment is [id number]  Confirmation number for his 8-5-20 appointment is [id number]  Mr. Nolen and sister are aware of appointment dates/times and his estimated pickup time for each appointment will be 12:30P.M.

## 2020-07-21 ENCOUNTER — HOSPITAL ENCOUNTER (OUTPATIENT)
Dept: RADIATION ONCOLOGY | Facility: HOSPITAL | Age: 60
Setting detail: RADIATION/ONCOLOGY SERIES
Discharge: HOME | End: 2020-07-21

## 2020-07-21 PROCEDURE — 77373 STRTCTC BDY RAD THER TX DLVR: CPT | Performed by: RADIOLOGY

## 2020-07-28 ENCOUNTER — HOSPITAL ENCOUNTER (OUTPATIENT)
Dept: RADIATION ONCOLOGY | Facility: HOSPITAL | Age: 60
Setting detail: RADIATION/ONCOLOGY SERIES
Discharge: HOME | End: 2020-07-28

## 2020-07-28 PROCEDURE — 77373 STRTCTC BDY RAD THER TX DLVR: CPT | Performed by: RADIOLOGY

## 2020-08-03 ENCOUNTER — HOSPITAL ENCOUNTER (OUTPATIENT)
Dept: RADIATION ONCOLOGY | Facility: HOSPITAL | Age: 60
Setting detail: RADIATION/ONCOLOGY SERIES
Discharge: HOME | End: 2020-08-03

## 2020-08-03 ENCOUNTER — HOSPITAL ENCOUNTER (OUTPATIENT)
Dept: RADIATION ONCOLOGY | Facility: HOSPITAL | Age: 60
Setting detail: RADIATION/ONCOLOGY SERIES
End: 2020-08-03

## 2020-08-03 PROCEDURE — 77373 STRTCTC BDY RAD THER TX DLVR: CPT | Performed by: RADIOLOGY

## 2020-08-05 ENCOUNTER — HOSPITAL ENCOUNTER (OUTPATIENT)
Dept: RADIATION ONCOLOGY | Facility: HOSPITAL | Age: 60
Setting detail: RADIATION/ONCOLOGY SERIES
Discharge: HOME | End: 2020-08-05

## 2020-08-05 PROCEDURE — 77373 STRTCTC BDY RAD THER TX DLVR: CPT | Performed by: RADIOLOGY

## 2020-08-05 PROCEDURE — 77336 RADIATION PHYSICS CONSULT: CPT | Performed by: RADIOLOGY

## 2020-09-11 ENCOUNTER — APPOINTMENT (OUTPATIENT)
Dept: ONCOLOGY | Facility: HOSPITAL | Age: 60
End: 2020-09-11

## 2020-09-11 ENCOUNTER — TELEPHONE (OUTPATIENT)
Dept: ONCOLOGY | Facility: CLINIC | Age: 60
End: 2020-09-11

## 2020-09-11 ENCOUNTER — APPOINTMENT (OUTPATIENT)
Dept: ONCOLOGY | Facility: CLINIC | Age: 60
End: 2020-09-11

## 2020-09-11 NOTE — TELEPHONE ENCOUNTER
Caller: Nena Longoria    Relationship to patient: Sister    Best call back number: [phone]    Concerns or Questions if Applicable:  Nena asked if the [facility] can begin arranging transportation for the pt starting with the F/U appt for 9.24.20 @ .

## 2020-09-11 NOTE — TELEPHONE ENCOUNTER
Oncology SW followed up with pt sister by phone. She advises that pt has completed radiation and is scheduled for follow up 9-23-20. Pt. Requires [facility] transportation.  Barriers in transport have been experienced as pt lives in [facility] and [facility] will not transport here stating [place] is closer and require statement verifying need.  Pt. Was permitted to transport to [place] for radiation as it was the closest facility offering SBRT.  SW has spoken with Dali at [facility] who requests verification of need.  Discussed with physician and plan to collaborate with Melanie Gaitan, pt lung navigator.  Sister, Nena, is aware of above plan and SW will follow up beginning of week as determination needed by 9-17-20 to arrange per [facility].

## 2020-09-16 NOTE — TELEPHONE ENCOUNTER
Oncology SW spoke with Ms. Heredia to advise of pt approval by [facility] region [facility] for transport.  Spoke with Dali at [facility] who confirmed pt is eligible for 6 months transport to see Dr. Sanchez.  Sister voiced understanding of plan and transportation arrangements.

## 2020-09-23 ENCOUNTER — OFFICE VISIT (OUTPATIENT)
Dept: ONCOLOGY | Facility: CLINIC | Age: 60
End: 2020-09-23

## 2020-09-23 ENCOUNTER — TELEPHONE (OUTPATIENT)
Dept: ONCOLOGY | Facility: CLINIC | Age: 60
End: 2020-09-23

## 2020-09-23 VITALS
DIASTOLIC BLOOD PRESSURE: 71 MMHG | HEIGHT: 71 IN | HEART RATE: 81 BPM | BODY MASS INDEX: 18.34 KG/M2 | RESPIRATION RATE: 18 BRPM | SYSTOLIC BLOOD PRESSURE: 120 MMHG | WEIGHT: 131 LBS | TEMPERATURE: 98.7 F

## 2020-09-23 DIAGNOSIS — G89.3 CANCER ASSOCIATED PAIN: ICD-10-CM

## 2020-09-23 DIAGNOSIS — C34.90 MALIGNANT NEOPLASM OF LUNG, UNSPECIFIED LATERALITY, UNSPECIFIED PART OF LUNG (HCC): Primary | ICD-10-CM

## 2020-09-23 DIAGNOSIS — J43.1 PANLOBULAR EMPHYSEMA (HCC): ICD-10-CM

## 2020-09-23 DIAGNOSIS — Z71.6 ENCOUNTER FOR TOBACCO USE CESSATION COUNSELING: ICD-10-CM

## 2020-09-23 PROCEDURE — 99214 OFFICE O/P EST MOD 30 MIN: CPT | Performed by: INTERNAL MEDICINE

## 2020-09-23 PROCEDURE — G0463 HOSPITAL OUTPT CLINIC VISIT: HCPCS | Performed by: INTERNAL MEDICINE

## 2020-09-23 RX ORDER — NICOTINE 21 MG/24HR
1 PATCH, TRANSDERMAL 24 HOURS TRANSDERMAL EVERY 24 HOURS
Qty: 30 EACH | Refills: 0 | Status: SHIPPED | OUTPATIENT
Start: 2020-09-23

## 2020-09-23 RX ORDER — TRAMADOL HYDROCHLORIDE 50 MG/1
50 TABLET ORAL EVERY 8 HOURS PRN
Qty: 90 TABLET | Refills: 0 | Status: SHIPPED | OUTPATIENT
Start: 2020-09-23 | End: 2020-12-11

## 2020-09-23 RX ORDER — VARENICLINE TARTRATE 0.5 MG/1
0.5 TABLET, FILM COATED ORAL DAILY
Qty: 90 TABLET | Refills: 0 | Status: SHIPPED | OUTPATIENT
Start: 2020-09-23

## 2020-09-23 NOTE — TELEPHONE ENCOUNTER
Called and spoke with Mendoza at pharmacy.  Clarification received for pt to have nicotine patch and chantix daily.  V/u obtained.

## 2020-09-23 NOTE — TELEPHONE ENCOUNTER
Mendoza kramer from [facility]     He received prescriptions for -  Chantix  30 day Nicotine patch trial.    He said typically a nicotine patch is only used for the 1st 7 days if Chantix is also prescribed.  Needs to clarify that 30 day supply of nicotine patch is correct.    [phone]

## 2020-09-23 NOTE — PROGRESS NOTES
Subjective     Vernon Nolen was seen in follow-up for lung cancer.  He completed SBRT to 2 different pulmonary nodules in [place].  Tolerated treatment well.  He is reporting pain bilaterally at the site of SBRT which is quite common.  Reassured patient that this will get better with time.  He is requesting pain medication to assist with pain.  I will send prescription of tramadol to his pharmacy.  Discussed with patient that he should not be driving on pain medication.  His chronic exertional shortness of breath is stable.  He has been using albuterol and his Brio inhaler.  He continues to smoke half pack per day.  I counseled him about smoking cessation.  He is requesting prescription of nicotine and Chantix which was sent to his pharmacy.      Social History     Socioeconomic History   • Marital status:      Spouse name: Not on file   • Number of children: 0   • Years of education: Not on file   • Highest education level: Not on file   Social Needs   • Financial resource strain: Not on file   • Food insecurity     Worry: Not on file     Inability: Not on file   • Transportation needs     Medical: Yes     Non-medical: Yes   Tobacco Use   • Smoking status: Current Every Day Smoker     Packs/day: 0.50     Years: 53.00     Pack years: 26.50     Types: Cigarettes   • Smokeless tobacco: Never Used   • Tobacco comment: trying to quit   Substance and Sexual Activity   • Alcohol use: No     Frequency: Never   • Drug use: Defer   • Sexual activity: Defer             Past Medical History, Past Surgical History, Social History, Family History have been reviewed and are without significant changes except as mentioned.    Review of Systems       CONSTITUTIONAL:  Fatigue + weight loss + No weight loss, fever, chills.  HEENT: Eyes: No visual loss, blurred vision, double vision or yellow sclerae. Ears, Nose, Throat: No hearing loss, sneezing, congestion, runny nose or sore throat.  SKIN: No rash or  "itching.  CARDIOVASCULAR: No chest pain, chest pressure or chest discomfort. No palpitations or edema.  RESPIRATORY: exertional SOB + chronic smoker's cough +   GASTROINTESTINAL: No anorexia, nausea, vomiting or diarrhea. No abdominal pain or blood.  GENITOURINARY: Negative for urgency, frequency or dysuria.   NEUROLOGICAL: No headache, dizziness, syncope, paralysis, ataxia, numbness or tingling in the extremities. No change in bowel or bladder control.  MUSCULOSKELETAL: Chronic joint pain + .  HEMATOLOGIC: No anemia, bleeding or bruising.  LYMPHATICS: No enlarged nodes. No history of splenectomy.  PSYCHIATRIC: No history of depression or anxiety.  ENDOCRINOLOGIC: No reports of sweating, cold or heat intolerance. No polyuria or polydipsia.  ALLERGIES: No history of asthma, hives, eczema or rhinitis.      Medications:  The current medication list was reviewed in the EMR    ALLERGIES:    Allergies   Allergen Reactions   • Penicillins Swelling       Objective      Vitals:    09/23/20 1333   BP: 120/71   Pulse: 81   Resp: 18   Temp: 98.7 °F (37.1 °C)   TempSrc: Temporal   Weight: 59.4 kg (131 lb)   Height: 180.3 cm (71\")   PainSc:   8   PainLoc: Generalized  Comment: neck and back     Current Status 9/23/2020   ECOG score 0       Physical Exam      GENERAL: Alert, awake, oriented.  Well dressed.  Not in apparent distress. Vitals as above.   HEAD: Normocephalic, atraumatic.   EYES: PERRL, EOMI. vision is grossly intact.  NECK: Supple, no adenopathy or thyromegaly.   THROAT: Normal oral cavity and pharynx. No inflammation, swelling, exudate, or lesions.  CARDIAC: Normal S1 and S2. No S3, S4 or murmurs. Rhythm is regular.  Extremities are warm and well perfused.   LUNGS: Clear to auscultation and percussion without rales, rhonchi, wheezing or diminished breath sounds.  ABDOMEN: Positive bowel sounds. Soft, nondistended, nontender. No guarding or rebound. No masses.  BACK:  No bony tenderness.   EXTREMITIES: Diffuse " osteoarthritis involving multiple joints + .  Peripheral pulses intact. No varicosities.  SKIN: No rash or bruising.  NEUROLOGICAL: Grossly non-focal exam. No focal weakness. Gait: Normal.   PSYCH: Mood and affect normal. No hallucination or suicidal thoughts.   LYMPHATIC: No cervical, supraclavicular or axillary adenopathy.       RECENT LABS:Independently reviewed and summarized    Hematology WBC   Date Value Ref Range Status   06/12/2020 6.64 3.40 - 10.80 10*3/mm3 Final     RBC   Date Value Ref Range Status   06/12/2020 5.86 (H) 4.14 - 5.80 10*6/mm3 Final     Hemoglobin   Date Value Ref Range Status   06/12/2020 17.0 13.0 - 17.7 g/dL Final     Hematocrit   Date Value Ref Range Status   06/12/2020 50.7 37.5 - 51.0 % Final     Platelets   Date Value Ref Range Status   06/12/2020 231 140 - 450 10*3/mm3 Final          Vernon Nolen reports a pain score of 8.  Given his pain assessment as noted, treatment options were discussed and the following options were decided upon as a follow-up plan to address the patient's pain: prescription for opiod analgesics.    Patient screened positive for depression based on a PHQ-9 score of 0 on 9/23/2020. Follow-up recommendations include: Suicide Risk Assessment performed.        Diagnosis:   (1) Adenocarcinoma of lung, left lower lobe   (2) Left lower lobe pulmonary nodule   (3) COPD   (4) Unintentional weight loss  (5) Cancer associated pain   (6) Encounter for tobacco use cessation counseling     Assessment/Plan       (1) Adenocarcinoma of lung, left lower lobe (2) Left lower lobe pulmonary nodule     Both nodules were treated with SBRT about a month ago.  I will obtain CT scan of chest in 2 months to evaluate response to treatment.    (3) COPD   Stable on albuterol and Breo.    (4) Unintentional weight loss  Nutrition consult.  Boost supplement samples were given to the patient.    (5) Cancer associated pain   Related to radiation therapy to the lungs.  Prescription of tramadol was  sent to his pharmacy.  He was instructed not to drive while on pain medication.    (6) Encounter for tobacco use cessation counseling   I had 5-7 minutes discussion with the patient about smoking cessation. Problems with continued smoking including respiratory, cardiovascular and oncological problems were discussion. Advice on smoking cessation was reiterated including methods of quitting and different medications and support system available for smoking cessation was discussed.   Patient understood and was agreeable.  Prescription of nicotine patch and Chantix was sent to his pharmacy.  Risk associated with Chantix including neuropsychiatric symptoms as well as GI side effects discussed at length.  He was in agreement in trying.        9/23/2020      CC:

## 2020-09-24 ENCOUNTER — APPOINTMENT (OUTPATIENT)
Dept: ONCOLOGY | Facility: CLINIC | Age: 60
End: 2020-09-24

## 2020-10-02 ENCOUNTER — TELEPHONE (OUTPATIENT)
Dept: NUTRITION | Facility: HOSPITAL | Age: 60
End: 2020-10-02

## 2020-10-02 NOTE — PROGRESS NOTES
Adult Outpatient Nutrition  Assessment    Patient Name:  Vernon Nolen  YOB: 1960  MRN: [id number]    Assessment Date:  10/2/2020    Comments: Follow-up call to check nutrition. Wt 131 lb. RN informed RDN that pt enjoyed samples of Boost Plus, but can't afford. Called home. Family member stated pt's appetite/intake remain decreased--confirmed that pt likes aaron Boost Plus. Half case of Boost Plus ready for  at pt's convenience.                        Electronically signed by:  Mallika Caldwell RD  10/02/20 11:27 CDT

## 2020-10-05 DIAGNOSIS — Z92.3 STATUS POST STEREOTACTIC RADIOSURGERY: ICD-10-CM

## 2020-10-05 DIAGNOSIS — C34.90 MALIGNANT NEOPLASM OF LUNG, UNSPECIFIED LATERALITY, UNSPECIFIED PART OF LUNG (HCC): Primary | ICD-10-CM

## 2020-10-16 ENCOUNTER — DOCUMENTATION (OUTPATIENT)
Dept: RADIATION ONCOLOGY | Facility: HOSPITAL | Age: 60
End: 2020-10-16

## 2020-10-16 NOTE — PROGRESS NOTES
ROZ called [facility] [phone] to find out if precertification was needed for transportation to Mr. Nolen’s follow up appointment on Nov 11th with Dr. Chilel. Spoke to Carmenza who states Mr. Nolen’s authorization for coming to [facility] in [place] is good until Dec 23, 2020. [facility] will need at least a three day notice to provide transportation. SW will remain available.

## 2020-10-23 ENCOUNTER — TELEPHONE (OUTPATIENT)
Dept: RADIATION ONCOLOGY | Facility: HOSPITAL | Age: 60
End: 2020-10-23

## 2020-10-23 NOTE — TELEPHONE ENCOUNTER
I left a message on patients voicemail that his CT scan in [place] has been changed to Nov 4th at 1200 so this can be completed prior to his scheduled appt with Dr. Chilel. This CT test was ordered by his oncologist, and I have cancelled the one ordered by Dr. Chilel as it was duplicate.

## 2020-11-04 ENCOUNTER — HOSPITAL ENCOUNTER (OUTPATIENT)
Dept: CT IMAGING | Facility: HOSPITAL | Age: 60
Discharge: HOME | End: 2020-11-04
Admitting: INTERNAL MEDICINE

## 2020-11-04 ENCOUNTER — TELEPHONE (OUTPATIENT)
Dept: RADIATION ONCOLOGY | Facility: HOSPITAL | Age: 60
End: 2020-11-04

## 2020-11-04 DIAGNOSIS — C34.90 MALIGNANT NEOPLASM OF LUNG, UNSPECIFIED LATERALITY, UNSPECIFIED PART OF LUNG (HCC): ICD-10-CM

## 2020-11-04 PROCEDURE — 71260 CT THORAX DX C+: CPT

## 2020-11-04 PROCEDURE — 25010000002 IOPAMIDOL 61 % SOLUTION: Performed by: INTERNAL MEDICINE

## 2020-11-04 RX ADMIN — IOPAMIDOL 80 ML: 612 INJECTION, SOLUTION INTRAVENOUS at 12:58

## 2020-11-04 NOTE — TELEPHONE ENCOUNTER
Spoke to Nena who states she would like this writer to call and set up transportation for Mr. Nolen’s appointment with Dr. Chilel on November 11th. ROZ called [facility] and spoke to rep Alejandre who states transportation has already been arranged for that appointment. The confirmation number is [id number]. Per [place], Mr. Nolen will need to call the dispatch office [phone] the day before his appointment to find out his pickup time. Nena is aware.

## 2020-11-11 ENCOUNTER — OFFICE VISIT (OUTPATIENT)
Dept: RADIATION ONCOLOGY | Facility: HOSPITAL | Age: 60
End: 2020-11-11

## 2020-11-11 ENCOUNTER — HOSPITAL ENCOUNTER (OUTPATIENT)
Dept: RADIATION ONCOLOGY | Facility: HOSPITAL | Age: 60
Setting detail: RADIATION/ONCOLOGY SERIES
End: 2020-11-11

## 2020-11-11 VITALS
HEART RATE: 91 BPM | SYSTOLIC BLOOD PRESSURE: 138 MMHG | WEIGHT: 126.9 LBS | DIASTOLIC BLOOD PRESSURE: 86 MMHG | HEIGHT: 71 IN | OXYGEN SATURATION: 96 % | BODY MASS INDEX: 17.77 KG/M2

## 2020-11-11 DIAGNOSIS — C34.80 MALIGNANT NEOPLASM OF OVERLAPPING SITES OF LUNG, UNSPECIFIED LATERALITY (HCC): Primary | ICD-10-CM

## 2020-11-11 DIAGNOSIS — F17.200 CURRENT EVERY DAY SMOKER: ICD-10-CM

## 2020-11-11 DIAGNOSIS — Z92.3 STATUS POST STEREOTACTIC RADIOSURGERY: ICD-10-CM

## 2020-11-11 DIAGNOSIS — Z85.828 HISTORY OF SKIN CANCER: ICD-10-CM

## 2020-11-11 PROCEDURE — G0463 HOSPITAL OUTPT CLINIC VISIT: HCPCS | Performed by: RADIOLOGY

## 2020-11-11 RX ORDER — BETAMETHASONE DIPROPIONATE 0.5 MG/G
CREAM TOPICAL
COMMUNITY
Start: 2020-10-07

## 2020-11-20 ENCOUNTER — TELEPHONE (OUTPATIENT)
Dept: RADIATION ONCOLOGY | Facility: HOSPITAL | Age: 60
End: 2020-11-20

## 2020-11-20 NOTE — TELEPHONE ENCOUNTER
On 11-19-20 ROZ received call from Nena who states [facility] transportation will be sending over a form that will need to be signed in order to get his transportation approved to see Dr. Ahmadi for pain management next week.    On 11-20-20 ROZ spoke to Dali at [facility] who states transportation is typically only approved if the patient sees the closest provider to where the patient lives. ROZ called [facility] in [place], [place] who informed this writer they do take Mr. Nolen’s insurance, but they would not be able to get him in until the middle of January or early February. ROZ called [facility] back with this information and spoke to Carmenza who states they are able to approve the transportation as long as they know Mr. Nolen will end up moving to a closer provider once an appointment becomes available. ROZ sent via fax [phone] the medical referral for non-emergency medical transportation outside the medical service area form that has been signed by the physician. ROZ also informed , Susan, per Dr. Chilel to send a referral to [facility] in [place]. ROZ received a confirmation on fax and also informed Nena via phone about the above information and she verbalized understanding.

## 2020-11-26 DIAGNOSIS — C34.90 MALIGNANT NEOPLASM OF LUNG, UNSPECIFIED LATERALITY, UNSPECIFIED PART OF LUNG (HCC): Primary | ICD-10-CM

## 2020-11-30 ENCOUNTER — APPOINTMENT (OUTPATIENT)
Dept: CT IMAGING | Facility: HOSPITAL | Age: 60
End: 2020-11-30

## 2020-12-04 ENCOUNTER — LAB (OUTPATIENT)
Dept: LAB | Facility: HOSPITAL | Age: 60
End: 2020-12-04

## 2020-12-04 DIAGNOSIS — C34.90 MALIGNANT NEOPLASM OF LUNG, UNSPECIFIED LATERALITY, UNSPECIFIED PART OF LUNG (HCC): ICD-10-CM

## 2020-12-04 LAB
ALBUMIN SERPL-MCNC: 4.7 G/DL (ref 3.5–5.2)
ALBUMIN/GLOB SERPL: 1.6 G/DL
ALP SERPL-CCNC: 96 U/L (ref 39–117)
ALT SERPL W P-5'-P-CCNC: 16 U/L (ref 1–41)
ANION GAP SERPL CALCULATED.3IONS-SCNC: 10 MMOL/L (ref 5–15)
AST SERPL-CCNC: 20 U/L (ref 1–40)
BASOPHILS # BLD AUTO: 0.06 10*3/MM3 (ref 0–0.2)
BASOPHILS NFR BLD AUTO: 0.8 % (ref 0–1.5)
BILIRUB SERPL-MCNC: 0.8 MG/DL (ref 0–1.2)
BUN SERPL-MCNC: 23 MG/DL (ref 8–23)
BUN/CREAT SERPL: 27.1 (ref 7–25)
CALCIUM SPEC-SCNC: 9.8 MG/DL (ref 8.6–10.5)
CHLORIDE SERPL-SCNC: 102 MMOL/L (ref 98–107)
CO2 SERPL-SCNC: 28 MMOL/L (ref 22–29)
CREAT SERPL-MCNC: 0.85 MG/DL (ref 0.76–1.27)
DEPRECATED RDW RBC AUTO: 46.5 FL (ref 37–54)
EOSINOPHIL # BLD AUTO: 0 10*3/MM3 (ref 0–0.4)
EOSINOPHIL NFR BLD AUTO: 0 % (ref 0.3–6.2)
ERYTHROCYTE [DISTWIDTH] IN BLOOD BY AUTOMATED COUNT: 13.9 % (ref 12.3–15.4)
GFR SERPL CREATININE-BSD FRML MDRD: 91 ML/MIN/1.73
GLOBULIN UR ELPH-MCNC: 2.9 GM/DL
GLUCOSE SERPL-MCNC: 131 MG/DL (ref 65–99)
HCT VFR BLD AUTO: 51.4 % (ref 37.5–51)
HGB BLD-MCNC: 16.9 G/DL (ref 13–17.7)
IMM GRANULOCYTES # BLD AUTO: 0.02 10*3/MM3 (ref 0–0.05)
IMM GRANULOCYTES NFR BLD AUTO: 0.3 % (ref 0–0.5)
LYMPHOCYTES # BLD AUTO: 1.1 10*3/MM3 (ref 0.7–3.1)
LYMPHOCYTES NFR BLD AUTO: 14.7 % (ref 19.6–45.3)
MCH RBC QN AUTO: 29.9 PG (ref 26.6–33)
MCHC RBC AUTO-ENTMCNC: 32.9 G/DL (ref 31.5–35.7)
MCV RBC AUTO: 90.8 FL (ref 79–97)
MONOCYTES # BLD AUTO: 0.65 10*3/MM3 (ref 0.1–0.9)
MONOCYTES NFR BLD AUTO: 8.7 % (ref 5–12)
NEUTROPHILS NFR BLD AUTO: 5.65 10*3/MM3 (ref 1.7–7)
NEUTROPHILS NFR BLD AUTO: 75.5 % (ref 42.7–76)
NRBC BLD AUTO-RTO: 0 /100 WBC (ref 0–0.2)
PLATELET # BLD AUTO: 241 10*3/MM3 (ref 140–450)
PMV BLD AUTO: 10.1 FL (ref 6–12)
POTASSIUM SERPL-SCNC: 3.7 MMOL/L (ref 3.5–5.2)
PROT SERPL-MCNC: 7.6 G/DL (ref 6–8.5)
RBC # BLD AUTO: 5.66 10*6/MM3 (ref 4.14–5.8)
SODIUM SERPL-SCNC: 140 MMOL/L (ref 136–145)
WBC # BLD AUTO: 7.48 10*3/MM3 (ref 3.4–10.8)

## 2020-12-04 PROCEDURE — 36415 COLL VENOUS BLD VENIPUNCTURE: CPT

## 2020-12-04 PROCEDURE — 85025 COMPLETE CBC W/AUTO DIFF WBC: CPT

## 2020-12-04 PROCEDURE — 80053 COMPREHEN METABOLIC PANEL: CPT

## 2020-12-07 ENCOUNTER — TELEPHONE (OUTPATIENT)
Dept: ONCOLOGY | Facility: CLINIC | Age: 60
End: 2020-12-07

## 2020-12-11 ENCOUNTER — OFFICE VISIT (OUTPATIENT)
Dept: ONCOLOGY | Facility: CLINIC | Age: 60
End: 2020-12-11

## 2020-12-11 ENCOUNTER — LAB (OUTPATIENT)
Dept: ONCOLOGY | Facility: HOSPITAL | Age: 60
End: 2020-12-11

## 2020-12-11 VITALS
DIASTOLIC BLOOD PRESSURE: 80 MMHG | SYSTOLIC BLOOD PRESSURE: 144 MMHG | HEART RATE: 92 BPM | RESPIRATION RATE: 18 BRPM | HEIGHT: 71 IN | TEMPERATURE: 96.8 F | BODY MASS INDEX: 17.77 KG/M2 | WEIGHT: 126.9 LBS

## 2020-12-11 DIAGNOSIS — Z92.3 STATUS POST STEREOTACTIC RADIOSURGERY: ICD-10-CM

## 2020-12-11 DIAGNOSIS — G89.3 CANCER ASSOCIATED PAIN: ICD-10-CM

## 2020-12-11 DIAGNOSIS — C34.80 MALIGNANT NEOPLASM OF OVERLAPPING SITES OF LUNG, UNSPECIFIED LATERALITY (HCC): Primary | ICD-10-CM

## 2020-12-11 PROCEDURE — 99214 OFFICE O/P EST MOD 30 MIN: CPT | Performed by: INTERNAL MEDICINE

## 2020-12-11 PROCEDURE — G0463 HOSPITAL OUTPT CLINIC VISIT: HCPCS | Performed by: INTERNAL MEDICINE

## 2020-12-11 RX ORDER — MELOXICAM 7.5 MG/1
7.5 TABLET ORAL DAILY
COMMUNITY
Start: 2020-11-23

## 2020-12-11 RX ORDER — HYDROCODONE BITARTRATE AND ACETAMINOPHEN 5; 325 MG/1; MG/1
1 TABLET ORAL EVERY 6 HOURS PRN
Qty: 120 TABLET | Refills: 0 | Status: SHIPPED | OUTPATIENT
Start: 2020-12-11 | End: 2021-01-25 | Stop reason: SDUPTHER

## 2020-12-11 RX ORDER — GABAPENTIN 300 MG/1
300 CAPSULE ORAL 3 TIMES DAILY
COMMUNITY
Start: 2020-11-23 | End: 2020-12-11

## 2020-12-11 NOTE — PROGRESS NOTES
Subjective     Vernon Nolen was seen in follow up for lung cancer.   He has RLL and LLL nodule which was treated with SBRT.   Result of CT reviewed.   Showed LLL nodule resolved. RLL mass - smaller.   No new nodules.   No new metastatic disease.   He is reporting pain in rib cage likely related to SBRT.   He has tried tramadol and neurontin without much benefit.   We will d/c neurontin and start him on norco.   Counseled about opioid induce constipation.   ROS as below.     Past Medical History, Past Surgical History, Social History, Family History have been reviewed and are without significant changes except as mentioned.    Review of Systems       CONSTITUTIONAL: fatigue + No weight loss, fever, chills, weakness.  HEENT: Eyes: No visual loss, blurred vision, double vision or yellow sclerae. Ears, Nose, Throat: No hearing loss, sneezing, congestion, runny nose or sore throat.  SKIN: No rash or itching.  CARDIOVASCULAR: No chest pain, chest pressure or chest discomfort. No palpitations or edema.  RESPIRATORY: exertional SOB + smoker's cough + .  GASTROINTESTINAL: No anorexia, nausea, vomiting or diarrhea. No abdominal pain or blood.  GENITOURINARY: Negative for urgency, frequency or dysuria.   NEUROLOGICAL: No headache, dizziness, syncope, paralysis, ataxia, numbness or tingling in the extremities. No change in bowel or bladder control.  MUSCULOSKELETAL: chronic joint pain +   HEMATOLOGIC: No anemia, bleeding or bruising.  LYMPHATICS: No enlarged nodes. No history of splenectomy.  PSYCHIATRIC: No history of depression or anxiety.  ENDOCRINOLOGIC: No reports of sweating, cold or heat intolerance. No polyuria or polydipsia.  ALLERGIES: No history of asthma, hives, eczema or rhinitis.      Medications:  The current medication list was reviewed in the EMR    ALLERGIES:    Allergies   Allergen Reactions   • Penicillins Swelling       Objective      Vitals:    12/11/20 1509   BP: 144/80   Pulse: 92   Resp: 18    Temp: 96.8 °F (36 °C)         Current Status 9/23/2020   ECOG score 0       Physical Exam      GENERAL: Alert, awake, oriented.  Well dressed.  Not in apparent distress. Vitals as above.   HEAD: Normocephalic, atraumatic.   EYES: PERRL, EOMI. vision is grossly intact.  NECK: Supple, no adenopathy or thyromegaly.   THROAT: Normal oral cavity and pharynx. No inflammation, swelling, exudate, or lesions.  CARDIAC: Normal S1 and S2. No S3, S4 or murmurs. Rhythm is regular.  Extremities are warm and well perfused.   LUNGS: Clear to auscultation and percussion without rales, rhonchi, wheezing or diminished breath sounds.  ABDOMEN: Positive bowel sounds. Soft, nondistended, nontender. No guarding or rebound. No masses.  BACK:  No bony tenderness.   EXTREMITIES: Osteoarthritis involving multiple joints + .  Peripheral pulses intact. No varicosities.  SKIN: No rash or bruising.  NEUROLOGICAL: Grossly non-focal exam. No focal weakness. Gait: Normal.   PSYCH: Mood and affect normal. No hallucination or suicidal thoughts.   LYMPHATIC: No cervical, supraclavicular or axillary adenopathy.       RECENT LABS:Independently reviewed and summarized  Hematology WBC   Date Value Ref Range Status   12/04/2020 7.48 3.40 - 10.80 10*3/mm3 Final     RBC   Date Value Ref Range Status   12/04/2020 5.66 4.14 - 5.80 10*6/mm3 Final     Hemoglobin   Date Value Ref Range Status   12/04/2020 16.9 13.0 - 17.7 g/dL Final     Hematocrit   Date Value Ref Range Status   12/04/2020 51.4 (H) 37.5 - 51.0 % Final     Platelets   Date Value Ref Range Status   12/04/2020 241 140 - 450 10*3/mm3 Final          Imaging (independently reviewed and summarized):     Result of CT scan of chest with contrast from November 4, 2020 reviewed.  This showed 1.38 x 1.41 x 1.72 cm irregularly marginated solid neoplasm at left lung base.  Improved compared to prior CT scan from May 2020.    Vernon Nolen reports a pain score of 8.  Given his pain assessment as noted,  treatment options were discussed and the following options were decided upon as a follow-up plan to address the patient's pain: prescription for opiod analgesics.    Patient screened positive for depression based on a PHQ-9 score of 0 on 12/11/2020. Follow-up recommendations include: Suicide Risk Assessment performed.      Diagnosis:   (1) Adenocarcinoma of lung, left lower lobe   (2) Right lower lobe pulmonary nodule   (3) Cancer associated pain     Assessment/Plan     (1) Adenocarcinoma of lung, left lower lobe (2) Right lower lobe pulmonary nodule     Both nodule treated with SBRT.   Nodule right LL resolved.   LLL nodule smaller, but persistent.   He continues to smoke.   Counseled about smoking cessation.     Recommendations:   - CT chest in 3 months.   - Recommend smoking cessation.       (3) Cancer associated pain    Rib cage pain related to SBRT.  He has tried ultram and neurontin without much benefit.   New prescription of norco sent to pharmacy.   Counseled about opioid induce constipation.     Orders placed on today's visit:   Norco   CT chest          12/11/2020      CC:

## 2020-12-11 NOTE — PATIENT INSTRUCTIONS
Patient Instructions for CT Scan    · Your CT scan is being done without any oral contrast.  Your CT scan may be done with IV contrast, if you have an allergy to iodine--please tell your nurse.    · Do not eat or drink 4 hours prior to scan.     · You may take your medications with sips of water, except DO NOT take your diabetic pill the morning of the test.    Arrive at the Outpatient Surgery entrance at [facility] 20 minutes prior to appointment time.    You will receive a phone call with an appointment for your CT scan.  Please call our office, if someone does not contact you with 3 days.    Nely Lloyd RN  December 11, 2020  15:29 CST

## 2021-01-25 ENCOUNTER — TELEPHONE (OUTPATIENT)
Dept: ONCOLOGY | Facility: CLINIC | Age: 61
End: 2021-01-25

## 2021-01-25 DIAGNOSIS — G89.3 CANCER ASSOCIATED PAIN: ICD-10-CM

## 2021-01-25 DIAGNOSIS — C34.80 MALIGNANT NEOPLASM OF OVERLAPPING SITES OF LUNG, UNSPECIFIED LATERALITY (HCC): ICD-10-CM

## 2021-01-25 RX ORDER — HYDROCODONE BITARTRATE AND ACETAMINOPHEN 5; 325 MG/1; MG/1
1 TABLET ORAL EVERY 6 HOURS PRN
Qty: 120 TABLET | Refills: 0 | Status: SHIPPED | OUTPATIENT
Start: 2021-01-25 | End: 2021-03-08 | Stop reason: SDUPTHER

## 2021-01-25 NOTE — TELEPHONE ENCOUNTER
Nena, patient's sister, calling.    Patient needs refill on  [place] 5/325    He has been out of them about a week.    Pharmacy in [facility] is correct ([place])    Please call when sent- [phone]

## 2021-02-08 ENCOUNTER — TRANSCRIBE ORDERS (OUTPATIENT)
Dept: CT IMAGING | Facility: HOSPITAL | Age: 61
End: 2021-02-08

## 2021-02-08 DIAGNOSIS — C34.80 MALIGNANT NEOPLASM OF OVERLAPPING SITES OF LUNG, UNSPECIFIED LATERALITY (HCC): Primary | ICD-10-CM

## 2021-02-10 ENCOUNTER — APPOINTMENT (OUTPATIENT)
Dept: CT IMAGING | Facility: HOSPITAL | Age: 61
End: 2021-02-10

## 2021-02-15 ENCOUNTER — APPOINTMENT (OUTPATIENT)
Dept: CT IMAGING | Facility: HOSPITAL | Age: 61
End: 2021-02-15

## 2021-02-15 ENCOUNTER — HOSPITAL ENCOUNTER (OUTPATIENT)
Dept: RADIATION ONCOLOGY | Facility: HOSPITAL | Age: 61
Setting detail: RADIATION/ONCOLOGY SERIES
End: 2021-02-15

## 2021-02-23 ENCOUNTER — APPOINTMENT (OUTPATIENT)
Dept: CT IMAGING | Facility: HOSPITAL | Age: 61
End: 2021-02-23

## 2021-02-26 ENCOUNTER — HOSPITAL ENCOUNTER (OUTPATIENT)
Dept: CT IMAGING | Facility: HOSPITAL | Age: 61
Discharge: HOME | End: 2021-02-26

## 2021-02-26 ENCOUNTER — LAB (OUTPATIENT)
Dept: LAB | Facility: HOSPITAL | Age: 61
End: 2021-02-26

## 2021-02-26 DIAGNOSIS — Z92.3 STATUS POST STEREOTACTIC RADIOSURGERY: ICD-10-CM

## 2021-02-26 DIAGNOSIS — C34.80 MALIGNANT NEOPLASM OF OVERLAPPING SITES OF LUNG, UNSPECIFIED LATERALITY (HCC): ICD-10-CM

## 2021-02-26 DIAGNOSIS — Z85.828 HISTORY OF SKIN CANCER: ICD-10-CM

## 2021-02-26 DIAGNOSIS — F17.200 CURRENT EVERY DAY SMOKER: ICD-10-CM

## 2021-02-26 LAB
BUN SERPL-MCNC: 17 MG/DL (ref 8–23)
CREAT SERPL-MCNC: 0.83 MG/DL (ref 0.76–1.27)
GFR SERPL CREATININE-BSD FRML MDRD: 94 ML/MIN/1.73

## 2021-02-26 PROCEDURE — 82565 ASSAY OF CREATININE: CPT

## 2021-02-26 PROCEDURE — 25010000002 IOPAMIDOL 61 % SOLUTION: Performed by: RADIOLOGY

## 2021-02-26 PROCEDURE — 84520 ASSAY OF UREA NITROGEN: CPT

## 2021-02-26 PROCEDURE — 36415 COLL VENOUS BLD VENIPUNCTURE: CPT

## 2021-02-26 PROCEDURE — 71260 CT THORAX DX C+: CPT

## 2021-02-26 RX ADMIN — IOPAMIDOL 75 ML: 612 INJECTION, SOLUTION INTRAVENOUS at 14:38

## 2021-03-08 ENCOUNTER — TELEPHONE (OUTPATIENT)
Dept: ONCOLOGY | Facility: CLINIC | Age: 61
End: 2021-03-08

## 2021-03-08 RX ORDER — HYDROCODONE BITARTRATE AND ACETAMINOPHEN 5; 325 MG/1; MG/1
1 TABLET ORAL EVERY 6 HOURS PRN
Qty: 120 TABLET | Refills: 0 | Status: SHIPPED | OUTPATIENT
Start: 2021-03-08 | End: 2021-03-30 | Stop reason: SDUPTHER

## 2021-03-12 DIAGNOSIS — C34.80 MALIGNANT NEOPLASM OF OVERLAPPING SITES OF LUNG, UNSPECIFIED LATERALITY (HCC): Primary | ICD-10-CM

## 2021-03-16 ENCOUNTER — TELEPHONE (OUTPATIENT)
Dept: RADIATION ONCOLOGY | Facility: HOSPITAL | Age: 61
End: 2021-03-16

## 2021-03-16 NOTE — TELEPHONE ENCOUNTER
This patient has a follow-up CAT scan scheduled for Friday on March 19 of 2021.  He did have some enlargement of his left lower lobe lesion with good response on the right.    I suspect the findings on the left side represent scar tissue, but if it is suspicious in nature we may do a PET scan for further evaluation.

## 2021-03-19 ENCOUNTER — OFFICE VISIT (OUTPATIENT)
Dept: ONCOLOGY | Facility: CLINIC | Age: 61
End: 2021-03-19

## 2021-03-19 ENCOUNTER — LAB (OUTPATIENT)
Dept: ONCOLOGY | Facility: HOSPITAL | Age: 61
End: 2021-03-19

## 2021-03-19 VITALS
WEIGHT: 125.9 LBS | SYSTOLIC BLOOD PRESSURE: 143 MMHG | RESPIRATION RATE: 18 BRPM | DIASTOLIC BLOOD PRESSURE: 98 MMHG | TEMPERATURE: 97.7 F | HEART RATE: 69 BPM | BODY MASS INDEX: 17.56 KG/M2

## 2021-03-19 DIAGNOSIS — C34.80 MALIGNANT NEOPLASM OF OVERLAPPING SITES OF LUNG, UNSPECIFIED LATERALITY (HCC): ICD-10-CM

## 2021-03-19 DIAGNOSIS — C34.80 MALIGNANT NEOPLASM OF OVERLAPPING SITES OF LUNG, UNSPECIFIED LATERALITY (HCC): Primary | ICD-10-CM

## 2021-03-19 DIAGNOSIS — G89.3 CANCER ASSOCIATED PAIN: ICD-10-CM

## 2021-03-19 LAB
ALBUMIN SERPL-MCNC: 4.4 G/DL (ref 3.5–5.2)
ALBUMIN/GLOB SERPL: 1.7 G/DL
ALP SERPL-CCNC: 104 U/L (ref 39–117)
ALT SERPL W P-5'-P-CCNC: 13 U/L (ref 1–41)
ANION GAP SERPL CALCULATED.3IONS-SCNC: 5 MMOL/L (ref 5–15)
AST SERPL-CCNC: 15 U/L (ref 1–40)
BASOPHILS # BLD AUTO: 0.06 10*3/MM3 (ref 0–0.2)
BASOPHILS NFR BLD AUTO: 1 % (ref 0–1.5)
BILIRUB SERPL-MCNC: 0.5 MG/DL (ref 0–1.2)
BUN SERPL-MCNC: 14 MG/DL (ref 8–23)
BUN/CREAT SERPL: 15.7 (ref 7–25)
CALCIUM SPEC-SCNC: 9.8 MG/DL (ref 8.6–10.5)
CHLORIDE SERPL-SCNC: 102 MMOL/L (ref 98–107)
CO2 SERPL-SCNC: 33 MMOL/L (ref 22–29)
CREAT SERPL-MCNC: 0.89 MG/DL (ref 0.76–1.27)
DEPRECATED RDW RBC AUTO: 47.3 FL (ref 37–54)
EOSINOPHIL # BLD AUTO: 0.15 10*3/MM3 (ref 0–0.4)
EOSINOPHIL NFR BLD AUTO: 2.6 % (ref 0.3–6.2)
ERYTHROCYTE [DISTWIDTH] IN BLOOD BY AUTOMATED COUNT: 14.6 % (ref 12.3–15.4)
GFR SERPL CREATININE-BSD FRML MDRD: 87 ML/MIN/1.73
GLOBULIN UR ELPH-MCNC: 2.6 GM/DL
GLUCOSE SERPL-MCNC: 114 MG/DL (ref 65–99)
HCT VFR BLD AUTO: 50.5 % (ref 37.5–51)
HGB BLD-MCNC: 17.2 G/DL (ref 13–17.7)
HOLD SPECIMEN: NORMAL
IMM GRANULOCYTES # BLD AUTO: 0.02 10*3/MM3 (ref 0–0.05)
IMM GRANULOCYTES NFR BLD AUTO: 0.3 % (ref 0–0.5)
LYMPHOCYTES # BLD AUTO: 1.44 10*3/MM3 (ref 0.7–3.1)
LYMPHOCYTES NFR BLD AUTO: 25.1 % (ref 19.6–45.3)
MCH RBC QN AUTO: 30.2 PG (ref 26.6–33)
MCHC RBC AUTO-ENTMCNC: 34.1 G/DL (ref 31.5–35.7)
MCV RBC AUTO: 88.6 FL (ref 79–97)
MONOCYTES # BLD AUTO: 0.51 10*3/MM3 (ref 0.1–0.9)
MONOCYTES NFR BLD AUTO: 8.9 % (ref 5–12)
NEUTROPHILS NFR BLD AUTO: 3.56 10*3/MM3 (ref 1.7–7)
NEUTROPHILS NFR BLD AUTO: 62.1 % (ref 42.7–76)
NRBC BLD AUTO-RTO: 0 /100 WBC (ref 0–0.2)
PLATELET # BLD AUTO: 262 10*3/MM3 (ref 140–450)
PMV BLD AUTO: 9.6 FL (ref 6–12)
POTASSIUM SERPL-SCNC: 4.9 MMOL/L (ref 3.5–5.2)
PROT SERPL-MCNC: 7 G/DL (ref 6–8.5)
RBC # BLD AUTO: 5.7 10*6/MM3 (ref 4.14–5.8)
SODIUM SERPL-SCNC: 140 MMOL/L (ref 136–145)
WBC # BLD AUTO: 5.74 10*3/MM3 (ref 3.4–10.8)

## 2021-03-19 PROCEDURE — 85025 COMPLETE CBC W/AUTO DIFF WBC: CPT

## 2021-03-19 PROCEDURE — G0463 HOSPITAL OUTPT CLINIC VISIT: HCPCS | Performed by: INTERNAL MEDICINE

## 2021-03-19 PROCEDURE — 80053 COMPREHEN METABOLIC PANEL: CPT

## 2021-03-19 PROCEDURE — 99213 OFFICE O/P EST LOW 20 MIN: CPT | Performed by: INTERNAL MEDICINE

## 2021-03-19 NOTE — PROGRESS NOTES
Subjective     Vernon Nolen was seen in follow-up for lung cancer.  Patient was treated with SBRT to left lower lobe and right lower lobe pulmonary nodule.  His last CT scan from February 26 reviewed and is unfortunately showing increase in the size of left lower lobe mass now measuring close to 4 cm.  He is also reporting worsening pain in this area.  He has been taking Norco every 6 hour but this does not help a lot.    Given worsening pain, increase in the size of left lower lobe mass I believe he needs a PET scan for restaging purposes.  Depending upon the result of PET scan we might have to do a biopsy.    Meanwhile, I have instructed him to increase his Norco to every 4 hours to control the pain.  We will plan to see him after the result of PET scan is available.    Past Medical History, Past Surgical History, Social History, Family History have been reviewed and are without significant changes except as mentioned.        Medications:  The current medication list was reviewed in the EMR    ALLERGIES:    Allergies   Allergen Reactions   • Penicillins Swelling       Objective        Vitals:    03/19/21 1110   BP: 143/98   Pulse: 69   Resp: 18   Temp: 97.7 °F (36.5 °C)       Current Status 12/11/2020   ECOG score 1       Physical Exam  Vitals and nursing note reviewed.   Constitutional:       Appearance: Normal appearance.   Cardiovascular:      Rate and Rhythm: Normal rate and regular rhythm.      Heart sounds: No murmur heard.     Pulmonary:      Effort: Pulmonary effort is normal.      Breath sounds: Wheezing present. No rhonchi.   Abdominal:      General: Bowel sounds are normal. There is no distension.      Palpations: Abdomen is soft. There is no mass.      Tenderness: There is no abdominal tenderness.   Neurological:      General: No focal deficit present.      Mental Status: He is alert and oriented to person, place, and time.   Psychiatric:         Mood and Affect: Mood normal.         Behavior:  Behavior normal.               RECENT LABS:Independently reviewed and summarized    Hematology WBC   Date Value Ref Range Status   12/04/2020 7.48 3.40 - 10.80 10*3/mm3 Final     RBC   Date Value Ref Range Status   12/04/2020 5.66 4.14 - 5.80 10*6/mm3 Final     Hemoglobin   Date Value Ref Range Status   12/04/2020 16.9 13.0 - 17.7 g/dL Final     Hematocrit   Date Value Ref Range Status   12/04/2020 51.4 (H) 37.5 - 51.0 % Final     Platelets   Date Value Ref Range Status   12/04/2020 241 140 - 450 10*3/mm3 Final          Imaging (independently reviewed and summarized):   Results of CT chest with contrast from February 26, 2021 reviewed.  This showed slight enlargement of left lower lobe mass compared to prior CT scan from November 4, 2020.  This mass measured 3.59 x 1.53 cm compared to prior 1.83 x 1.22 cm      Diagnosis:   (1) Adenocarcinoma of lung, left lower lobe   (2) Right lower lobe pulmonary nodule   (3) Cancer associated pain     Assessment/Plan     Patient was treated with SBRT to left lower lobe and right lower lobe pulmonary nodule.  His last CT scan from February 26 reviewed and is unfortunately showing increase in the size of left lower lobe mass now measuring close to 4 cm.  He is also reporting worsening pain in this area.  He has been taking Norco every 6 hour but this does not help a lot.    Given worsening pain, increase in the size of left lower lobe mass I believe he needs a PET scan for restaging purposes.  Depending upon the result of PET scan we might have to do a biopsy.    Meanwhile, I have instructed him to increase his Norco to every 4 hours to control the pain.  We will plan to see him after the result of PET scan is available.    Recommendations   · PET scan to evaluate increase in the size of left lower lobe mass.  · Increase Norco to every 4 hours from every 6 hours.    Follow-up after PET scan to go over the result.        3/19/2021      CC:

## 2021-03-19 NOTE — PATIENT INSTRUCTIONS
INSTRUCTIONS FOR YOUR PET/CT EXAM AT [facility]    Report to Same Day Surgery the day of your appointment for your PET/CT scan.    DO NOT EAT, DRINK, SMOKE (THIS INCLUDES E-CIGARETTES), DIP OR CHEW AFTER MIDNIGHT THE MORNING OF YOUR EXAM.    EXCEPT, please drink 3-4 glasses of PLAIN tap water before your arrival at Same Day Surgery.  You may drink two (2) - 20 ounce bottles of unflavored bottled water before your arrival in place of the tap water.     You may take your morning medication with plain water EXCEPT YOUR DIABETIC MEDICATION.  DO NOT TAKE ANY DIABETIC MEDICATIONS THAT MORNING.    On Sunday, please eat High Protein/Low Carbohydrate diet.  For example: Protein: Chicken, Beef, Pork, Cheese, or Turkey.  You may have as much of these as you like.  Please limit Carbohydrates: Sodas, breads, pastas, gravy, cereals, chips, cakes, and cookies.    Please wear comfortable clothing.  Due to imaging, please do not wear overalls, coveralls or any clothing with metal on the shirt.    The test will take approximately 2 to 2 1/2 hours.  Upon arrival in the Radiology Department, you will be asked to fill out a questionnaire, the technologist will come and get you and escort you to the mobile PET unit.  The technologist will start an IV on you and check your blood sugar and if your blood sugar is within sufficient range do the exam, you will receive an injection of the liquid radiation.  The injection will not make you feel any different than you feel now.  It will not make you feel good but it will not make you feel bad either.  The injection must circulate for 45 minutes to an hour.  You will then lie on an imaging table and be scanned from your head to mid-thigh.  Images will take approximately 30 minutes.    A technologist will call you on the Friday before your exam to go over your instructions.  Please make sure your physician has a good phone number to contact you.  If you do not hear from [facility]  [facility] Nuclear Medicine Department by 2 p.m. On the Friday before your exam, please call [phone] or [phone].    If you need to reschedule your exam, please call the above phone numbers to reschedule your PET/CT exam.    YOU ARE BEING SCHEDULED FOR A PET/CT SCAN.  YOU SHOULD RECEIVE A PHONE CALL WITH YOUR APPOINTMENT WITHIN 3 DAYS.  IF YOU DO NOT RECEIVE A PHONE CALL WITH YOU APPOINTMENT, PLEASE CALL THE [facility] [phone].  THANK YOU!    Nely Lloyd RN  March 19, 2021  11:23 CDT

## 2021-03-26 ENCOUNTER — HOSPITAL ENCOUNTER (OUTPATIENT)
Dept: PET IMAGING | Facility: HOSPITAL | Age: 61
Discharge: HOME | End: 2021-03-26
Admitting: INTERNAL MEDICINE

## 2021-03-26 DIAGNOSIS — C34.80 MALIGNANT NEOPLASM OF OVERLAPPING SITES OF LUNG, UNSPECIFIED LATERALITY (HCC): ICD-10-CM

## 2021-03-26 PROCEDURE — 78815 PET IMAGE W/CT SKULL-THIGH: CPT

## 2021-03-26 PROCEDURE — A9552 F18 FDG: HCPCS | Performed by: INTERNAL MEDICINE

## 2021-03-26 PROCEDURE — 0 FLUDEOXYGLUCOSE F18 SOLUTION: Performed by: INTERNAL MEDICINE

## 2021-03-26 RX ADMIN — FLUDEOXYGLUCOSE F18 1 DOSE: 300 INJECTION INTRAVENOUS at 12:08

## 2021-03-29 PROBLEM — G89.3 CANCER ASSOCIATED PAIN: Status: ACTIVE | Noted: 2021-03-29

## 2021-03-30 ENCOUNTER — OFFICE VISIT (OUTPATIENT)
Dept: ONCOLOGY | Facility: CLINIC | Age: 61
End: 2021-03-30

## 2021-03-30 ENCOUNTER — DOCUMENTATION (OUTPATIENT)
Dept: NUTRITION | Facility: HOSPITAL | Age: 61
End: 2021-03-30

## 2021-03-30 VITALS
WEIGHT: 118.3 LBS | SYSTOLIC BLOOD PRESSURE: 150 MMHG | HEIGHT: 71 IN | BODY MASS INDEX: 16.56 KG/M2 | HEART RATE: 91 BPM | TEMPERATURE: 98.5 F | RESPIRATION RATE: 18 BRPM | DIASTOLIC BLOOD PRESSURE: 99 MMHG

## 2021-03-30 DIAGNOSIS — C34.80 MALIGNANT NEOPLASM OF OVERLAPPING SITES OF LUNG, UNSPECIFIED LATERALITY (HCC): Primary | ICD-10-CM

## 2021-03-30 DIAGNOSIS — G89.3 CANCER ASSOCIATED PAIN: ICD-10-CM

## 2021-03-30 PROCEDURE — 99214 OFFICE O/P EST MOD 30 MIN: CPT | Performed by: INTERNAL MEDICINE

## 2021-03-30 PROCEDURE — G0463 HOSPITAL OUTPT CLINIC VISIT: HCPCS | Performed by: INTERNAL MEDICINE

## 2021-03-30 RX ORDER — HYDROCODONE BITARTRATE AND ACETAMINOPHEN 5; 325 MG/1; MG/1
1 TABLET ORAL EVERY 6 HOURS PRN
Qty: 120 TABLET | Refills: 0 | Status: SHIPPED | OUTPATIENT
Start: 2021-03-30 | End: 2021-03-30

## 2021-03-30 NOTE — PROGRESS NOTES
"  Subjective     Vernon Nolen was seen in follow-up for lung cancer.  Patient was treated with SBRT to left lower lobe and right lower lobe pulmonary nodule.  His last CT scan from February 26 showed increase in the size of left lower lobe mass now measuring close to 4 cm.  Patient was reporting unintentional weight loss, worsening chest/back pain.  Due to this reason I obtain PET scan to see if increasing the size is due to true progression versus pseudoprogression from radiation.  PET scan is not showing any pathologic FDG activity indicating likely this is posttreatment changes.  I have instructed him to continue taking high-calorie high-protein diet and nutritional supplements -boost were given to him on today's visit.  His pain is likely secondary to inflammation/arthritis.  Continue using Norco as needed for pain.  New refill was sent to the pharmacy.      Past Medical History, Past Surgical History, Social History, Family History have been reviewed and are without significant changes except as mentioned.      Medications:  The current medication list was reviewed in the EMR    ALLERGIES:    Allergies   Allergen Reactions   • Penicillins Swelling       Objective      Vitals:    03/30/21 1040   BP: 150/99   Pulse: 91   Resp: 18   Temp: 98.5 °F (36.9 °C)   TempSrc: Temporal   Weight: 53.7 kg (118 lb 4.8 oz)   Height: 180.3 cm (70.98\")   PainSc:   9   PainLoc: Back     Current Status 3/30/2021   ECOG score 0       Physical Exam  Vitals and nursing note reviewed.   Constitutional:       Comments: Thin cachectic elderly male in no acute distress   HENT:      Mouth/Throat:      Mouth: Mucous membranes are moist.      Pharynx: No oropharyngeal exudate.   Cardiovascular:      Rate and Rhythm: Normal rate.      Heart sounds: No murmur heard.     Pulmonary:      Effort: Pulmonary effort is normal.      Breath sounds: Wheezing present. No rhonchi.   Abdominal:      General: Bowel sounds are normal. There is no " distension.      Palpations: Abdomen is soft. There is no mass.      Tenderness: There is no abdominal tenderness.   Neurological:      General: No focal deficit present.      Mental Status: He is alert and oriented to person, place, and time. Mental status is at baseline.   Psychiatric:         Mood and Affect: Mood normal.         Behavior: Behavior normal.         Thought Content: Thought content normal.               RECENT LABS:Independently reviewed and summarized  Hematology WBC   Date Value Ref Range Status   03/19/2021 5.74 3.40 - 10.80 10*3/mm3 Final     RBC   Date Value Ref Range Status   03/19/2021 5.70 4.14 - 5.80 10*6/mm3 Final     Hemoglobin   Date Value Ref Range Status   03/19/2021 17.2 13.0 - 17.7 g/dL Final     Hematocrit   Date Value Ref Range Status   03/19/2021 50.5 37.5 - 51.0 % Final     Platelets   Date Value Ref Range Status   03/19/2021 262 140 - 450 10*3/mm3 Final          Imaging (independently reviewed and summarized):     Result of PET scan from March 26, 2021 reviewed.  This showed marked interval decrease in the size of left lung mass with minimal FDG activity with SUV of 1.57.  These findings are consistent with treated lung neoplasm.    Compared to prior CT scan from February 26, 2021.    Vernon Nolen reports a pain score of 9.  Given his pain assessment as noted, treatment options were discussed and the following options were decided upon as a follow-up plan to address the patient's pain: prescription for opiod analgesics.    Patient screened positive for depression based on a PHQ-9 score of 0 on 3/30/2021. Follow-up recommendations include: Suicide Risk Assessment performed.      Diagnosis:   (1) Adenocarcinoma of lung, left lower lobe   (2) Right lower lobe pulmonary nodule   (3) Cancer associated pain     Assessment/Plan     Patient was treated with SBRT to left lower lobe and right lower lobe pulmonary nodule.  His last CT scan from February 26 showed increase in the size  of left lower lobe mass now measuring close to 4 cm.  Patient was reporting unintentional weight loss, worsening chest/back pain.  Due to this reason I obtain PET scan to see if increasing the size is due to true progression versus pseudoprogression from radiation.  PET scan is not showing any pathologic FDG activity indicating likely this is posttreatment changes.  I have instructed him to continue taking high-calorie high-protein diet and nutritional supplements -boost were given to him on today's visit.  His pain is likely secondary to inflammation/arthritis.  Continue using Norco as needed for pain.  New refill was sent to the pharmacy.    Recommendations:   -No evidence of cancer progression.  -New prescription of [place] was sent to the pharmacy.   -Follow-up with his primary care provider for evaluation of unintentional weight loss.  I suspect this is related to COPD. unlikely to be related to cancer diagnosis or treatment.  -CBC, CMP in 3 months.        3/30/2021      CC:

## 2021-05-10 ENCOUNTER — OFFICE VISIT (OUTPATIENT)
Dept: ONCOLOGY | Facility: CLINIC | Age: 61
End: 2021-05-10

## 2021-05-10 VITALS
WEIGHT: 121.5 LBS | TEMPERATURE: 98 F | DIASTOLIC BLOOD PRESSURE: 74 MMHG | HEART RATE: 82 BPM | RESPIRATION RATE: 20 BRPM | SYSTOLIC BLOOD PRESSURE: 122 MMHG | HEIGHT: 71 IN | BODY MASS INDEX: 17.01 KG/M2

## 2021-05-10 DIAGNOSIS — C34.80 MALIGNANT NEOPLASM OF OVERLAPPING SITES OF LUNG, UNSPECIFIED LATERALITY (HCC): Primary | ICD-10-CM

## 2021-05-10 DIAGNOSIS — G89.3 CANCER ASSOCIATED PAIN: ICD-10-CM

## 2021-05-10 PROBLEM — C34.90 MALIGNANT NEOPLASM OF LUNG (HCC): Chronic | Status: ACTIVE | Noted: 2020-06-12

## 2021-05-10 PROCEDURE — G0463 HOSPITAL OUTPT CLINIC VISIT: HCPCS | Performed by: INTERNAL MEDICINE

## 2021-05-10 PROCEDURE — 99214 OFFICE O/P EST MOD 30 MIN: CPT | Performed by: INTERNAL MEDICINE

## 2021-05-10 RX ORDER — HYDROCODONE BITARTRATE AND ACETAMINOPHEN 5; 325 MG/1; MG/1
1 TABLET ORAL EVERY 6 HOURS PRN
Qty: 120 TABLET | Refills: 0 | Status: SHIPPED | OUTPATIENT
Start: 2021-05-10 | End: 2021-06-07 | Stop reason: SDUPTHER

## 2021-05-10 NOTE — PROGRESS NOTES
"DATE OF VISIT: 5/10/2021      REASON FOR VISIT: Adenocarcinoma of lung s/p SBRT, left chest wall pain      HISTORY OF PRESENT ILLNESS:   60-year-old male with medical problem consisting of chronic nicotine addiction, history of adenocarcinoma of left lung and right lung nodule s/p  SBRT lungs to clinic today in absence of Dr. Daniel Guevara for evaluation of worsening pain in left rib cage region.  Patient states he has been having chronic pain in left rib cage region since his completion of SBRT with but recently pain has increased.  Complains of generalized arthritis pain as well.  Denies any new lymph node enlargement.  Denies any recent weight loss.  Denies any new hemoptysis.  Denies any new headaches or dizziness.          Past Medical History, Past Surgical History, Social History, Family History have been reviewed and are without significant changes except as mentioned.    Review of Systems   Respiratory: Positive for shortness of breath.    Musculoskeletal: Positive for arthralgias.        Complains of left rib cage pain.      A comprehensive 14 point review of systems was performed and was negative except as mentioned.    Medications:  The current medication list was reviewed in the EMR    ALLERGIES:    Allergies   Allergen Reactions   • Penicillins Swelling       Objective      Vitals:    05/10/21 1050   BP: 122/74   Pulse: 82   Resp: 20   Temp: 98 °F (36.7 °C)   TempSrc: Temporal   Weight: 55.1 kg (121 lb 8 oz)   Height: 180.3 cm (70.98\")   PainSc:   7   PainLoc: Generalized     Current Status 5/10/2021   ECOG score 0       Physical Exam  Cardiovascular:      Rate and Rhythm: Normal rate and regular rhythm.   Pulmonary:      Breath sounds: Normal breath sounds.   Musculoskeletal:      Comments: No skin lesions seen or no palpable mass in the area of patient's concern of pain.   Neurological:      Mental Status: He is alert and oriented to person, place, and time.           RECENT LABS:  Glucose   Date " Value Ref Range Status   03/19/2021 114 (H) 65 - 99 mg/dL Final     Sodium   Date Value Ref Range Status   03/19/2021 140 136 - 145 mmol/L Final     Potassium   Date Value Ref Range Status   03/19/2021 4.9 3.5 - 5.2 mmol/L Final     CO2   Date Value Ref Range Status   03/19/2021 33.0 (H) 22.0 - 29.0 mmol/L Final     Chloride   Date Value Ref Range Status   03/19/2021 102 98 - 107 mmol/L Final     Anion Gap   Date Value Ref Range Status   03/19/2021 5.0 5.0 - 15.0 mmol/L Final     Creatinine   Date Value Ref Range Status   03/19/2021 0.89 0.76 - 1.27 mg/dL Final     BUN   Date Value Ref Range Status   03/19/2021 14 8 - 23 mg/dL Final     BUN/Creatinine Ratio   Date Value Ref Range Status   03/19/2021 15.7 7.0 - 25.0 Final     Calcium   Date Value Ref Range Status   03/19/2021 9.8 8.6 - 10.5 mg/dL Final     eGFR Non  Amer   Date Value Ref Range Status   03/19/2021 87 >60 mL/min/1.73 Final     Alkaline Phosphatase   Date Value Ref Range Status   03/19/2021 104 39 - 117 U/L Final     Total Protein   Date Value Ref Range Status   03/19/2021 7.0 6.0 - 8.5 g/dL Final     ALT (SGPT)   Date Value Ref Range Status   03/19/2021 13 1 - 41 U/L Final     AST (SGOT)   Date Value Ref Range Status   03/19/2021 15 1 - 40 U/L Final     Total Bilirubin   Date Value Ref Range Status   03/19/2021 0.5 0.0 - 1.2 mg/dL Final     Albumin   Date Value Ref Range Status   03/19/2021 4.40 3.50 - 5.20 g/dL Final     Globulin   Date Value Ref Range Status   03/19/2021 2.6 gm/dL Final     Lab Results   Component Value Date    WBC 5.74 03/19/2021    HGB 17.2 03/19/2021    HCT 50.5 03/19/2021    MCV 88.6 03/19/2021     03/19/2021     Lab Results   Component Value Date    NEUTROABS 3.56 03/19/2021     No results found for: , LABCA2, AFPTM, HCGQUANT, , CHROMGRNA, [id number], CEA, REFLABREPO      PATHOLOGY:  * Cannot find OR log *         RADIOLOGY DATA :  No radiology results for the last 7 days        Assessment/Plan     1.   "Adenocarcinoma of left lung with right pulmonary nodule:  -Patient is s/p SBRT.  -PET/CT done on March 26, 2021 was reviewed with patient today.  There was no activity in the area of left lung nodule or left rib cage region worrisome for active malignancy.  -Patient claims that his pain has \"worse since last clinic visit and PET/CT.  -At the time of last CT scan there was evidence of enlargement of lung nodule which could be fibrosis from his radiation.  Recommend repeating PET/CT to rule out any recurrence or progression of malignancy.  -Patient is agreeable.  -PET/CT has been ordered.  -We will ask patient to return to clinic after PET/CT for further recommendation.    2.  Pain in left hip cage region  -Patient has current prescription of Norco 5/325 which has been refilled today.  -It was discussed with patient if PET/CT shows any progression of disease, will need to treat him with chemotherapy rather than pain medication by itself.  -We will ask patient to return to clinic after PET/CT for further recommendation    3. Advance Care Planning: For now patient remains full code and is able to make decisions.  Patient has health care surrogate mentioned on chart.                 PHQ-9 Total Score: 0   -Patient is not homicidal or suicidal.  No acute intervention required.    Vernon Nolen reports a pain score of 7.  Given his pain assessment as noted, treatment options were discussed and the following options were decided upon as a follow-up plan to address the patient's pain: continuation of current treatment plan for pain.         Peter Marvin MD  5/10/2021  17:29 CDT        Part of this note may be an electronic transcription/translation of spoken language to printed text using the Dragon Dictation System.          CC:          "

## 2021-05-10 NOTE — PATIENT INSTRUCTIONS
INSTRUCTIONS FOR YOUR PET/CT EXAM AT [facility]    Report to Same Day Surgery the day of your appointment for your PET/CT scan.    DO NOT EAT, DRINK, SMOKE (THIS INCLUDES E-CIGARETTES), DIP OR CHEW AFTER MIDNIGHT THE MORNING OF YOUR EXAM.    EXCEPT, please drink 3-4 glasses of PLAIN tap water before your arrival at Same Day Surgery.  You may drink two (2) - 20 ounce bottles of unflavored bottled water before your arrival in place of the tap water.     You may take your morning medication with plain water EXCEPT YOUR DIABETIC MEDICATION.  DO NOT TAKE ANY DIABETIC MEDICATIONS THAT MORNING.    On Sunday, please eat High Protein/Low Carbohydrate diet.  For example: Protein: Chicken, Beef, Pork, Cheese, or Turkey.  You may have as much of these as you like.  Please limit Carbohydrates: Sodas, breads, pastas, gravy, cereals, chips, cakes, and cookies.    Please wear comfortable clothing.  Due to imaging, please do not wear overalls, coveralls or any clothing with metal on the shirt.    The test will take approximately 2 to 2 1/2 hours.  Upon arrival in the Radiology Department, you will be asked to fill out a questionnaire, the technologist will come and get you and escort you to the mobile PET unit.  The technologist will start an IV on you and check your blood sugar and if your blood sugar is within sufficient range do the exam, you will receive an injection of the liquid radiation.  The injection will not make you feel any different than you feel now.  It will not make you feel good but it will not make you feel bad either.  The injection must circulate for 45 minutes to an hour.  You will then lie on an imaging table and be scanned from your head to mid-thigh.  Images will take approximately 30 minutes.    A technologist will call you on the Friday before your exam to go over your instructions.  Please make sure your physician has a good phone number to contact you.  If you do not hear from [facility]  [facility] Nuclear Medicine Department by 2 p.m. On the Friday before your exam, please call [phone] or [phone].    If you need to reschedule your exam, please call the above phone numbers to reschedule your PET/CT exam.    YOU ARE BEING SCHEDULED FOR A PET/CT SCAN.  YOU SHOULD RECEIVE A PHONE CALL WITH YOUR APPOINTMENT WITHIN 3 DAYS.  IF YOU DO NOT RECEIVE A PHONE CALL WITH YOU APPOINTMENT, PLEASE CALL THE [facility] [phone].  THANK YOU!    Nely Lloyd RN  May 10, 2021  11:21 CDT

## 2021-05-14 ENCOUNTER — APPOINTMENT (OUTPATIENT)
Dept: PET IMAGING | Facility: HOSPITAL | Age: 61
End: 2021-05-14

## 2021-05-21 ENCOUNTER — APPOINTMENT (OUTPATIENT)
Dept: PET IMAGING | Facility: HOSPITAL | Age: 61
End: 2021-05-21

## 2021-05-28 ENCOUNTER — HOSPITAL ENCOUNTER (OUTPATIENT)
Dept: PET IMAGING | Facility: HOSPITAL | Age: 61
Discharge: HOME | End: 2021-05-28
Admitting: INTERNAL MEDICINE

## 2021-05-28 DIAGNOSIS — C34.80 MALIGNANT NEOPLASM OF OVERLAPPING SITES OF LUNG, UNSPECIFIED LATERALITY (HCC): ICD-10-CM

## 2021-05-28 PROCEDURE — 78815 PET IMAGE W/CT SKULL-THIGH: CPT

## 2021-05-28 PROCEDURE — A9500 TC99M SESTAMIBI: HCPCS | Performed by: INTERNAL MEDICINE

## 2021-05-28 PROCEDURE — 0 TECHNETIUM SESTAMIBI: Performed by: INTERNAL MEDICINE

## 2021-05-28 RX ADMIN — TECHNETIUM TC 99M SESTAMIBI 1 DOSE: 1 INJECTION INTRAVENOUS at 09:53

## 2021-06-07 ENCOUNTER — OFFICE VISIT (OUTPATIENT)
Dept: ONCOLOGY | Facility: CLINIC | Age: 61
End: 2021-06-07

## 2021-06-07 VITALS
HEIGHT: 71 IN | HEART RATE: 83 BPM | BODY MASS INDEX: 16.8 KG/M2 | SYSTOLIC BLOOD PRESSURE: 132 MMHG | WEIGHT: 120 LBS | DIASTOLIC BLOOD PRESSURE: 73 MMHG | TEMPERATURE: 98.4 F | RESPIRATION RATE: 20 BRPM

## 2021-06-07 DIAGNOSIS — C34.80 MALIGNANT NEOPLASM OF OVERLAPPING SITES OF LUNG, UNSPECIFIED LATERALITY (HCC): Primary | Chronic | ICD-10-CM

## 2021-06-07 DIAGNOSIS — G89.3 CANCER ASSOCIATED PAIN: ICD-10-CM

## 2021-06-07 PROCEDURE — G0463 HOSPITAL OUTPT CLINIC VISIT: HCPCS | Performed by: INTERNAL MEDICINE

## 2021-06-07 PROCEDURE — 99214 OFFICE O/P EST MOD 30 MIN: CPT | Performed by: INTERNAL MEDICINE

## 2021-06-07 RX ORDER — HYDROCODONE BITARTRATE AND ACETAMINOPHEN 5; 325 MG/1; MG/1
1 TABLET ORAL EVERY 6 HOURS PRN
Qty: 120 TABLET | Refills: 0 | Status: SHIPPED | OUTPATIENT
Start: 2021-06-07 | End: 2021-07-07 | Stop reason: SDUPTHER

## 2021-06-07 NOTE — PROGRESS NOTES
"DATE OF VISIT: 6/7/2021      REASON FOR VISIT: Adenocarcinoma of lung s/p SBRT, left chest wall pain      HISTORY OF PRESENT ILLNESS:   60-year-old male with medical problem consisting of chronic nicotine addiction, history of adenocarcinoma of left lung and right lung nodule s/p SBRT was last seen here in clinic on May 10, 2021 in absence of Dr. Daniel Guevara.  Patient was complaining of worsening pain affecting left rib cage region for which patient had a PET/CT done on May 28, 2021 for evaluation.  Patient is here to discuss the results and further recommendation.  Complains of generalized arthritis pain.  Denies any discrete skin changes or mass at the site of pain on left rib cage region.  Denies any new hemoptysis.  Denies any new headache or dizziness.          Past Medical History, Past Surgical History, Social History, Family History have been reviewed and are without significant changes except as mentioned.    Review of Systems   Respiratory: Positive for shortness of breath.    Musculoskeletal: Positive for arthralgias.        Complains of left-sided rib cage pain   Hematological: Negative for adenopathy.      A comprehensive 14 point review of systems was performed and was negative except as mentioned.    Medications:  The current medication list was reviewed in the EMR    ALLERGIES:    Allergies   Allergen Reactions   • Penicillins Swelling       Objective      Vitals:    06/07/21 0936   BP: 132/73   Pulse: 83   Resp: 20   Temp: 98.4 °F (36.9 °C)   TempSrc: Temporal   Weight: 54.4 kg (120 lb)   Height: 180.3 cm (70.98\")   PainSc:   7   PainLoc: Generalized     Current Status 6/7/2021   ECOG score 0       Physical Exam  Cardiovascular:      Rate and Rhythm: Normal rate and regular rhythm.   Pulmonary:      Breath sounds: Normal breath sounds.   Musculoskeletal:      Comments: Decreased range of motion   Neurological:      Mental Status: He is alert and oriented to person, place, and time. "           RECENT LABS:  Glucose   Date Value Ref Range Status   03/19/2021 114 (H) 65 - 99 mg/dL Final     Sodium   Date Value Ref Range Status   03/19/2021 140 136 - 145 mmol/L Final     Potassium   Date Value Ref Range Status   03/19/2021 4.9 3.5 - 5.2 mmol/L Final     CO2   Date Value Ref Range Status   03/19/2021 33.0 (H) 22.0 - 29.0 mmol/L Final     Chloride   Date Value Ref Range Status   03/19/2021 102 98 - 107 mmol/L Final     Anion Gap   Date Value Ref Range Status   03/19/2021 5.0 5.0 - 15.0 mmol/L Final     Creatinine   Date Value Ref Range Status   03/19/2021 0.89 0.76 - 1.27 mg/dL Final     BUN   Date Value Ref Range Status   03/19/2021 14 8 - 23 mg/dL Final     BUN/Creatinine Ratio   Date Value Ref Range Status   03/19/2021 15.7 7.0 - 25.0 Final     Calcium   Date Value Ref Range Status   03/19/2021 9.8 8.6 - 10.5 mg/dL Final     eGFR Non  Amer   Date Value Ref Range Status   03/19/2021 87 >60 mL/min/1.73 Final     Alkaline Phosphatase   Date Value Ref Range Status   03/19/2021 104 39 - 117 U/L Final     Total Protein   Date Value Ref Range Status   03/19/2021 7.0 6.0 - 8.5 g/dL Final     ALT (SGPT)   Date Value Ref Range Status   03/19/2021 13 1 - 41 U/L Final     AST (SGOT)   Date Value Ref Range Status   03/19/2021 15 1 - 40 U/L Final     Total Bilirubin   Date Value Ref Range Status   03/19/2021 0.5 0.0 - 1.2 mg/dL Final     Albumin   Date Value Ref Range Status   03/19/2021 4.40 3.50 - 5.20 g/dL Final     Globulin   Date Value Ref Range Status   03/19/2021 2.6 gm/dL Final     Lab Results   Component Value Date    WBC 5.74 03/19/2021    HGB 17.2 03/19/2021    HCT 50.5 03/19/2021    MCV 88.6 03/19/2021     03/19/2021     Lab Results   Component Value Date    NEUTROABS 3.56 03/19/2021     No results found for: , LABCA2, AFPTM, HCGQUANT, , CHROMGRNA, [id number], CEA, REFLABREPO      PATHOLOGY:  * Cannot find OR log *         RADIOLOGY DATA :  PET/CT done on May 28, 2021 was  reviewed with radiologist, it showed:    IMPRESSION:  1.  Stable appearing somewhat ill-defined left basilar, left  lower lobe nodular density is similar in size to prior PET/CT  examination the without any appreciable F-18 FDG glucose uptake.  Minimal left lateral chest wall increased uptake SUV 2.14. This  is similar to prior examination. Nuclear medicine PET/CT  examination is otherwise unremarkable.        No radiology results for the last 7 days        Assessment/Plan     1.  Adenocarcinoma of left lung with right pulmonary nodule:  -Patient is s/p SBRT  -Patient had a PET/CT done on May 28, 2021 for evaluation of worsening left rib cage pain.  -PET/CT does not show any evidence of recurrence on malignancy.  There is a chronic minimal uptake in left chest wall region without any discrete mass on CT part of PET/CT  -Recommend continue with clinical surveillance.  -We will ask patient to return to clinic in 8 weeks with repeat CBC and CMP on that day to follow-up with Dr. Daniel Guevara.  -Next CT scan will be ordered upon description of Dr. Johnny Larson for surveillance    2.  Pain in left rib cage  region:  -Patient still complains of pain in left rib cage region without any PET/CT showing bone metastases.  There is faint uptake in the left chest wall region on PET/CT.  -We will prescribe him Norco 5/325 for his chronic pain in that area.  Prescription has been sent to his pharmacy today  -Patient was counseled about bowel regimen to prevent constipation    3. Advance Care Planning: For now patient remains full code and is able to make decisions.  Patient has health care surrogate mentioned on chart.                 PHQ-9 Total Score:       Vernon Nolen reports a pain score of 7.  Given his pain assessment as noted, treatment options were discussed and the following options were decided upon as a follow-up plan to address the patient's pain: continuation of current treatment plan for pain.          Peter Marvin MD  6/7/2021  09:43 CDT        Part of this note may be an electronic transcription/translation of spoken language to printed text using the Dragon Dictation System.          CC:

## 2021-06-30 ENCOUNTER — APPOINTMENT (OUTPATIENT)
Dept: ONCOLOGY | Facility: HOSPITAL | Age: 61
End: 2021-06-30

## 2021-06-30 ENCOUNTER — APPOINTMENT (OUTPATIENT)
Dept: ONCOLOGY | Facility: CLINIC | Age: 61
End: 2021-06-30

## 2021-07-07 ENCOUNTER — TELEPHONE (OUTPATIENT)
Dept: ONCOLOGY | Facility: CLINIC | Age: 61
End: 2021-07-07

## 2021-07-07 DIAGNOSIS — C34.80 MALIGNANT NEOPLASM OF OVERLAPPING SITES OF LUNG, UNSPECIFIED LATERALITY (HCC): Chronic | ICD-10-CM

## 2021-07-07 RX ORDER — HYDROCODONE BITARTRATE AND ACETAMINOPHEN 5; 325 MG/1; MG/1
1 TABLET ORAL EVERY 6 HOURS PRN
Qty: 120 TABLET | Refills: 0 | Status: SHIPPED | OUTPATIENT
Start: 2021-07-07 | End: 2021-08-06 | Stop reason: SDUPTHER

## 2021-08-06 ENCOUNTER — TELEPHONE (OUTPATIENT)
Dept: ONCOLOGY | Facility: CLINIC | Age: 61
End: 2021-08-06

## 2021-08-06 DIAGNOSIS — C34.80 MALIGNANT NEOPLASM OF OVERLAPPING SITES OF LUNG, UNSPECIFIED LATERALITY (HCC): Chronic | ICD-10-CM

## 2021-08-06 RX ORDER — HYDROCODONE BITARTRATE AND ACETAMINOPHEN 5; 325 MG/1; MG/1
1 TABLET ORAL EVERY 6 HOURS PRN
Qty: 120 TABLET | Refills: 0 | Status: SHIPPED | OUTPATIENT
Start: 2021-08-06 | End: 2021-09-07 | Stop reason: SDUPTHER

## 2021-08-08 NOTE — PROGRESS NOTES
Subjective     Vernon Nolen was seen in follow-up for lung cancer.  He continues struggle with his chronic back pain which is not responding to Norco 5.  We reviewed the result of last PET scan which showed no evidence of lung cancer or metastatic disease.  Discussed with patient that his back pain is unlikely to be related to lung cancer or treatment.  I will arrange referral to pain clinic.      Past Medical History, Past Surgical History, Social History, Family History have been reviewed and are without significant changes except as mentioned.        Medications:  The current medication list was reviewed in the EMR    ALLERGIES:    Allergies   Allergen Reactions   • Penicillins Swelling       Objective      Vitals:    08/09/21 0956   BP: 148/92   Pulse: 62   Resp: 18   Temp: 98.3 °F (36.8 °C)   SpO2: 97%         Current Status 6/7/2021   ECOG score 0       Physical Exam  Vitals and nursing note reviewed.   Constitutional:       Appearance: Normal appearance.   Abdominal:      General: There is no distension.      Palpations: Abdomen is soft. There is no mass.      Tenderness: There is no abdominal tenderness.   Neurological:      General: No focal deficit present.      Mental Status: He is alert and oriented to person, place, and time. Mental status is at baseline.   Psychiatric:         Mood and Affect: Mood normal.         Behavior: Behavior normal.         Thought Content: Thought content normal.               RECENT LABS:Independently reviewed and summarized  Hematology WBC   Date Value Ref Range Status   03/19/2021 5.74 3.40 - 10.80 10*3/mm3 Final     RBC   Date Value Ref Range Status   03/19/2021 5.70 4.14 - 5.80 10*6/mm3 Final     Hemoglobin   Date Value Ref Range Status   03/19/2021 17.2 13.0 - 17.7 g/dL Final     Hematocrit   Date Value Ref Range Status   03/19/2021 50.5 37.5 - 51.0 % Final     Platelets   Date Value Ref Range Status   03/19/2021 262 140 - 450 10*3/mm3 Final     Lab Results    Component Value Date    GLUCOSE 101 (H) 08/09/2021    BUN 12 08/09/2021    CREATININE 0.85 08/09/2021    EGFRIFNONA 91 08/09/2021    BCR 14.1 08/09/2021    K 4.7 08/09/2021    CO2 28.0 08/09/2021    CALCIUM 9.3 08/09/2021    ALBUMIN 4.20 08/09/2021    AST 24 08/09/2021    ALT 24 08/09/2021          Vernon Nolen reports a pain score of 6.  Given his pain assessment as noted, treatment options were discussed and the following options were decided upon as a follow-up plan to address the patient's pain: prescription for opiod analgesics.    Patient screened positive for depression based on a PHQ-9 score of 0 on 8/9/2021. Follow-up recommendations include: Suicide Risk Assessment performed.        Diagnosis:   (1) Adenocarcinoma of lung, left lower lobe   (2) Right lower lobe pulmonary nodule     Oncology/Hematology History   Malignant neoplasm of lung (CMS/HCC)    Initial Diagnosis    Malignant neoplasm of lung (CMS/HCC)     5/7/2020 Biopsy    CT Guided Biopsy Left Lung mass:  Malignant cells are present consistent with adenocarcinoma.       5/21/2020 Imaging    PET/CT - Eun Lemus:  FDG-avid nodules in both lower lobes, the larger on the left.  The nodule at the left lung base is consistent with the known history of lung carcinoma.  The nodule at the right lung base likely represents a small metastatic focus.  A second primary lesion could have a similar appearance but would be less likely.  Correlation and continued follow-up are recommended.     6/12/2020 Cancer Staged    Staging form: Lung, AJCC 8th Edition  - Clinical stage from 6/12/2020: Stage CELINE (cT1b, cN0, cM1a) - Signed by Josr Guevara MD on 6/12/2020 7/21/2020 - 8/5/2020 Radiation    Radiation OncologyTreatment Course:  Vernon Nolen received 5000 cGy in 4 fractions to right lower lobe of lung via stereotactic radiosurgery.  Patient also received 4800 cGy in 4 fractions to left lower lobe of lung.     11/4/2020 Imaging    CT  Chest:  IMPRESSION:  1.38 x 1.41 x 1.72 cm irregular marginated solid neoplasm left  lung base laterally as detailed above. Comparison with prior  outside exams would be beneficial to evaluate for the stability  of this lesion. No suspicious appearing lymph nodes.     Background of pulmonary emphysema. No acute pulmonary or pleural  finding.         (3) Back pain      Assessment/Plan       (1) Adenocarcinoma of lung, left lower lobe (2) Right lower lobe pulmonary nodule     Chronic, stable.  Treated with SBRT.  Last PET scan on May 28, 2021 showed no evidence of recurrent cancer.  I will order CT scan of chest  in 1 month to evaluate ongoing response to treatment.    (3) Back pain     Chronic, stable  Etiology for his back pain remains unclear.  He has multiple PET scan and CT scan which has showed no evidence of cancer recurrence.  Unlikely this is related to cancer diagnosis or treatment.  Is currently taking Norco 5 which has not helped a whole lot with the pain.  I also tried Neurontin and tramadol without much benefit in the past.  Given unexplained persistent chronic back pain I believe he would be better served by management with pain specialist.  Referral to pain regiment was arranged.  New prescription of [place] was sent to his pharmacy in meanwhile.      8/7/2021      CC:

## 2021-08-09 ENCOUNTER — OFFICE VISIT (OUTPATIENT)
Dept: ONCOLOGY | Facility: CLINIC | Age: 61
End: 2021-08-09

## 2021-08-09 ENCOUNTER — LAB (OUTPATIENT)
Dept: ONCOLOGY | Facility: HOSPITAL | Age: 61
End: 2021-08-09

## 2021-08-09 VITALS
WEIGHT: 120 LBS | HEART RATE: 62 BPM | DIASTOLIC BLOOD PRESSURE: 92 MMHG | BODY MASS INDEX: 16.74 KG/M2 | TEMPERATURE: 98.3 F | SYSTOLIC BLOOD PRESSURE: 148 MMHG | OXYGEN SATURATION: 97 % | RESPIRATION RATE: 18 BRPM

## 2021-08-09 DIAGNOSIS — C34.80 MALIGNANT NEOPLASM OF OVERLAPPING SITES OF LUNG, UNSPECIFIED LATERALITY (HCC): ICD-10-CM

## 2021-08-09 DIAGNOSIS — C34.80 MALIGNANT NEOPLASM OF OVERLAPPING SITES OF LUNG, UNSPECIFIED LATERALITY (HCC): Primary | Chronic | ICD-10-CM

## 2021-08-09 DIAGNOSIS — G89.29 CHRONIC BILATERAL THORACIC BACK PAIN: ICD-10-CM

## 2021-08-09 DIAGNOSIS — M54.6 CHRONIC BILATERAL THORACIC BACK PAIN: ICD-10-CM

## 2021-08-09 LAB
ALBUMIN SERPL-MCNC: 4.2 G/DL (ref 3.5–5.2)
ALBUMIN/GLOB SERPL: 1.6 G/DL
ALP SERPL-CCNC: 108 U/L (ref 39–117)
ALT SERPL W P-5'-P-CCNC: 24 U/L (ref 1–41)
ANION GAP SERPL CALCULATED.3IONS-SCNC: 6 MMOL/L (ref 5–15)
AST SERPL-CCNC: 24 U/L (ref 1–40)
BASOPHILS # BLD AUTO: 0.07 10*3/MM3 (ref 0–0.2)
BASOPHILS NFR BLD AUTO: 1.1 % (ref 0–1.5)
BILIRUB SERPL-MCNC: 0.6 MG/DL (ref 0–1.2)
BUN SERPL-MCNC: 12 MG/DL (ref 8–23)
BUN/CREAT SERPL: 14.1 (ref 7–25)
CALCIUM SPEC-SCNC: 9.3 MG/DL (ref 8.6–10.5)
CHLORIDE SERPL-SCNC: 98 MMOL/L (ref 98–107)
CO2 SERPL-SCNC: 28 MMOL/L (ref 22–29)
CREAT SERPL-MCNC: 0.85 MG/DL (ref 0.76–1.27)
DEPRECATED RDW RBC AUTO: 45.4 FL (ref 37–54)
EOSINOPHIL # BLD AUTO: 0.1 10*3/MM3 (ref 0–0.4)
EOSINOPHIL NFR BLD AUTO: 1.6 % (ref 0.3–6.2)
ERYTHROCYTE [DISTWIDTH] IN BLOOD BY AUTOMATED COUNT: 14.4 % (ref 12.3–15.4)
GFR SERPL CREATININE-BSD FRML MDRD: 91 ML/MIN/1.73
GLOBULIN UR ELPH-MCNC: 2.7 GM/DL
GLUCOSE SERPL-MCNC: 101 MG/DL (ref 65–99)
HCT VFR BLD AUTO: 52.9 % (ref 37.5–51)
HGB BLD-MCNC: 17.7 G/DL (ref 13–17.7)
HOLD SPECIMEN: NORMAL
IMM GRANULOCYTES # BLD AUTO: 0.02 10*3/MM3 (ref 0–0.05)
IMM GRANULOCYTES NFR BLD AUTO: 0.3 % (ref 0–0.5)
LYMPHOCYTES # BLD AUTO: 1.52 10*3/MM3 (ref 0.7–3.1)
LYMPHOCYTES NFR BLD AUTO: 23.8 % (ref 19.6–45.3)
MCH RBC QN AUTO: 29.2 PG (ref 26.6–33)
MCHC RBC AUTO-ENTMCNC: 33.5 G/DL (ref 31.5–35.7)
MCV RBC AUTO: 87.1 FL (ref 79–97)
MONOCYTES # BLD AUTO: 0.55 10*3/MM3 (ref 0.1–0.9)
MONOCYTES NFR BLD AUTO: 8.6 % (ref 5–12)
NEUTROPHILS NFR BLD AUTO: 4.13 10*3/MM3 (ref 1.7–7)
NEUTROPHILS NFR BLD AUTO: 64.6 % (ref 42.7–76)
NRBC BLD AUTO-RTO: 0 /100 WBC (ref 0–0.2)
PLATELET # BLD AUTO: 276 10*3/MM3 (ref 140–450)
PMV BLD AUTO: 9.6 FL (ref 6–12)
POTASSIUM SERPL-SCNC: 4.7 MMOL/L (ref 3.5–5.2)
PROT SERPL-MCNC: 6.9 G/DL (ref 6–8.5)
RBC # BLD AUTO: 6.07 10*6/MM3 (ref 4.14–5.8)
SODIUM SERPL-SCNC: 132 MMOL/L (ref 136–145)
WBC # BLD AUTO: 6.39 10*3/MM3 (ref 3.4–10.8)

## 2021-08-09 PROCEDURE — 99213 OFFICE O/P EST LOW 20 MIN: CPT | Performed by: INTERNAL MEDICINE

## 2021-08-09 PROCEDURE — G0463 HOSPITAL OUTPT CLINIC VISIT: HCPCS | Performed by: INTERNAL MEDICINE

## 2021-08-09 PROCEDURE — 80053 COMPREHEN METABOLIC PANEL: CPT

## 2021-08-09 PROCEDURE — 85025 COMPLETE CBC W/AUTO DIFF WBC: CPT

## 2021-08-09 NOTE — PATIENT INSTRUCTIONS
Patient Instructions for CT Scan    · Your CT scan is being done without any oral contrast.  Your CT scan may be done with IV contrast, if you have an allergy to iodine--please tell your nurse.    · Do not eat or drink 4 hours prior to scan.     · You may take your medications with sips of water, except DO NOT take your diabetic pill the morning of the test.    Arrive at the Outpatient Surgery entrance at [facility] 20 minutes prior to appointment time.    You will receive a phone call with an appointment for your CT scan.  Please call our office, if someone does not contact you with 3 days.    Nely Lloyd RN  August 9, 2021  10:12 CDT

## 2021-09-07 DIAGNOSIS — C34.80 MALIGNANT NEOPLASM OF OVERLAPPING SITES OF LUNG, UNSPECIFIED LATERALITY (HCC): Chronic | ICD-10-CM

## 2021-09-07 RX ORDER — HYDROCODONE BITARTRATE AND ACETAMINOPHEN 5; 325 MG/1; MG/1
1 TABLET ORAL EVERY 6 HOURS PRN
Qty: 120 TABLET | Refills: 0 | Status: SHIPPED | OUTPATIENT
Start: 2021-09-07

## 2021-09-07 NOTE — TELEPHONE ENCOUNTER
Rx Refill Note  Requested Prescriptions     Pending Prescriptions Disp Refills   • HYDROcodone-acetaminophen (NORCO) 5-325 MG per tablet 120 tablet 0     Sig: Take 1 tablet by mouth Every 6 (Six) Hours As Needed for Moderate Pain  or Severe Pain .      Last office visit with prescribing clinician: 8/9/2021      Next office visit with prescribing clinician: 11/9/2021            Lety Cruz RN  09/07/21, 09:16 CDT

## 2021-09-07 NOTE — TELEPHONE ENCOUNTER
Incoming Refill Request      Medication requested (name and dose): hydrocodone    Pharmacy where request should be sent: [facility]    Additional details provided by patient: pt is completely out    Best call back number: [phone]    Does the patient have less than a 3 day supply:  [x] Yes  [] No    Pricilla Adler  09/07/21, 08:53 CDT

## 2021-09-08 ENCOUNTER — TELEPHONE (OUTPATIENT)
Dept: GENERAL RADIOLOGY | Facility: HOSPITAL | Age: 61
End: 2021-09-08

## 2021-09-09 ENCOUNTER — NURSE NAVIGATOR (OUTPATIENT)
Dept: ONCOLOGY | Facility: CLINIC | Age: 61
End: 2021-09-09

## 2021-09-09 NOTE — PROGRESS NOTES
ONCOLOGY PATIENT NAVIGATION    DIAGNOSIS: Lung Cancer  ENCOUNTER TYPE: Phone    Recent referral by Angelina Garcia LCSW to discuss pt’s questions regarding status of his lung cancer.  Was able to speak with patient by phone today after several prior unsuccessful attempts to contact. Reinforced information previously provided by Dr. Sanchez regarding lung cancer including stage, NSCL vs. small cell and current stable, remission status. Information also conveyed to sister, Nena Longoria as per pt’s request. Sister proceeded to inquire about obtaining increase in disability payments and recently filled pain medicine. Encouraged them to discuss with disability provider and pain medicine with pain management provider with whom patient has a pending a referral for consult appt. Option of discussing disability with Financial Navigator and pain management with Dr. Sanchez at next also offered.   Also discussed CT appt missed yesterday, 9/8/21. Pt states he was unaware of appt; will reschedule as per pt’s request.    Addendum: CT appt rescheduled for 9/22/21. Called pt and spoke to sister who voiced understanding.

## 2021-09-10 ENCOUNTER — APPOINTMENT (OUTPATIENT)
Dept: CT IMAGING | Facility: HOSPITAL | Age: 61
End: 2021-09-10

## 2021-09-18 ENCOUNTER — TRANSCRIBE ORDERS (OUTPATIENT)
Dept: GENERAL RADIOLOGY | Facility: HOSPITAL | Age: 61
End: 2021-09-18

## 2021-09-18 DIAGNOSIS — C34.80 MALIGNANT NEOPLASM OF OVERLAPPING SITES OF LUNG, UNSPECIFIED LATERALITY (HCC): Primary | ICD-10-CM

## 2021-09-22 ENCOUNTER — APPOINTMENT (OUTPATIENT)
Dept: CT IMAGING | Facility: HOSPITAL | Age: 61
End: 2021-09-22

## 2021-09-22 ENCOUNTER — LAB (OUTPATIENT)
Dept: LAB | Facility: HOSPITAL | Age: 61
End: 2021-09-22

## 2021-09-22 ENCOUNTER — HOSPITAL ENCOUNTER (OUTPATIENT)
Dept: CT IMAGING | Facility: HOSPITAL | Age: 61
Discharge: HOME | End: 2021-09-22

## 2021-09-22 DIAGNOSIS — C34.80 MALIGNANT NEOPLASM OF OVERLAPPING SITES OF LUNG, UNSPECIFIED LATERALITY (HCC): Chronic | ICD-10-CM

## 2021-09-22 DIAGNOSIS — C34.80 MALIGNANT NEOPLASM OF OVERLAPPING SITES OF LUNG, UNSPECIFIED LATERALITY (HCC): ICD-10-CM

## 2021-09-22 LAB
BUN SERPL-MCNC: 16 MG/DL (ref 8–23)
CREAT SERPL-MCNC: 0.75 MG/DL (ref 0.76–1.27)
GFR SERPL CREATININE-BSD FRML MDRD: 105 ML/MIN/1.73

## 2021-09-22 PROCEDURE — 82565 ASSAY OF CREATININE: CPT

## 2021-09-22 PROCEDURE — 71260 CT THORAX DX C+: CPT

## 2021-09-22 PROCEDURE — 25010000002 IOPAMIDOL 61 % SOLUTION: Performed by: INTERNAL MEDICINE

## 2021-09-22 PROCEDURE — 36415 COLL VENOUS BLD VENIPUNCTURE: CPT

## 2021-09-22 PROCEDURE — 84520 ASSAY OF UREA NITROGEN: CPT

## 2021-09-22 RX ADMIN — IOPAMIDOL 90 ML: 612 INJECTION, SOLUTION INTRAVENOUS at 14:08

## 2021-09-27 ENCOUNTER — TELEPHONE (OUTPATIENT)
Dept: ONCOLOGY | Facility: HOSPITAL | Age: 61
End: 2021-09-27

## 2021-09-27 NOTE — TELEPHONE ENCOUNTER
----- Message from Josr Guevara MD sent at 9/25/2021  8:52 AM CDT -----  CT scan showed no evidence of cancer progression

## 2021-11-09 ENCOUNTER — LAB (OUTPATIENT)
Dept: ONCOLOGY | Facility: HOSPITAL | Age: 61
End: 2021-11-09

## 2021-11-09 ENCOUNTER — OFFICE VISIT (OUTPATIENT)
Dept: ONCOLOGY | Facility: CLINIC | Age: 61
End: 2021-11-09

## 2021-11-09 VITALS
WEIGHT: 124.1 LBS | OXYGEN SATURATION: 94 % | BODY MASS INDEX: 17.32 KG/M2 | DIASTOLIC BLOOD PRESSURE: 84 MMHG | SYSTOLIC BLOOD PRESSURE: 131 MMHG | TEMPERATURE: 96.9 F | RESPIRATION RATE: 18 BRPM | HEART RATE: 63 BPM

## 2021-11-09 DIAGNOSIS — C34.80 MALIGNANT NEOPLASM OF OVERLAPPING SITES OF LUNG, UNSPECIFIED LATERALITY (HCC): ICD-10-CM

## 2021-11-09 DIAGNOSIS — C34.80 MALIGNANT NEOPLASM OF OVERLAPPING SITES OF LUNG, UNSPECIFIED LATERALITY (HCC): Primary | ICD-10-CM

## 2021-11-09 LAB
ALBUMIN SERPL-MCNC: 4.5 G/DL (ref 3.5–5.2)
ALBUMIN/GLOB SERPL: 2.1 G/DL
ALP SERPL-CCNC: 109 U/L (ref 39–117)
ALT SERPL W P-5'-P-CCNC: 12 U/L (ref 1–41)
ANION GAP SERPL CALCULATED.3IONS-SCNC: 7 MMOL/L (ref 5–15)
AST SERPL-CCNC: 14 U/L (ref 1–40)
BASOPHILS # BLD AUTO: 0.08 10*3/MM3 (ref 0–0.2)
BASOPHILS NFR BLD AUTO: 1.4 % (ref 0–1.5)
BILIRUB SERPL-MCNC: 0.4 MG/DL (ref 0–1.2)
BUN SERPL-MCNC: 16 MG/DL (ref 8–23)
BUN/CREAT SERPL: 17.2 (ref 7–25)
CALCIUM SPEC-SCNC: 9.6 MG/DL (ref 8.6–10.5)
CHLORIDE SERPL-SCNC: 100 MMOL/L (ref 98–107)
CO2 SERPL-SCNC: 30 MMOL/L (ref 22–29)
CREAT SERPL-MCNC: 0.93 MG/DL (ref 0.76–1.27)
DEPRECATED RDW RBC AUTO: 45.8 FL (ref 37–54)
EOSINOPHIL # BLD AUTO: 0 10*3/MM3 (ref 0–0.4)
EOSINOPHIL NFR BLD AUTO: 0 % (ref 0.3–6.2)
ERYTHROCYTE [DISTWIDTH] IN BLOOD BY AUTOMATED COUNT: 14.2 % (ref 12.3–15.4)
GFR SERPL CREATININE-BSD FRML MDRD: 82 ML/MIN/1.73
GLOBULIN UR ELPH-MCNC: 2.1 GM/DL
GLUCOSE SERPL-MCNC: 106 MG/DL (ref 65–99)
HCT VFR BLD AUTO: 50.5 % (ref 37.5–51)
HGB BLD-MCNC: 16.8 G/DL (ref 13–17.7)
HOLD SPECIMEN: NORMAL
IMM GRANULOCYTES # BLD AUTO: 0.02 10*3/MM3 (ref 0–0.05)
IMM GRANULOCYTES NFR BLD AUTO: 0.3 % (ref 0–0.5)
LYMPHOCYTES # BLD AUTO: 1.44 10*3/MM3 (ref 0.7–3.1)
LYMPHOCYTES NFR BLD AUTO: 24.9 % (ref 19.6–45.3)
MCH RBC QN AUTO: 29.5 PG (ref 26.6–33)
MCHC RBC AUTO-ENTMCNC: 33.3 G/DL (ref 31.5–35.7)
MCV RBC AUTO: 88.8 FL (ref 79–97)
MONOCYTES # BLD AUTO: 0.43 10*3/MM3 (ref 0.1–0.9)
MONOCYTES NFR BLD AUTO: 7.4 % (ref 5–12)
NEUTROPHILS NFR BLD AUTO: 3.81 10*3/MM3 (ref 1.7–7)
NEUTROPHILS NFR BLD AUTO: 66 % (ref 42.7–76)
NRBC BLD AUTO-RTO: 0 /100 WBC (ref 0–0.2)
PLATELET # BLD AUTO: 252 10*3/MM3 (ref 140–450)
PMV BLD AUTO: 10 FL (ref 6–12)
POTASSIUM SERPL-SCNC: 5.1 MMOL/L (ref 3.5–5.2)
PROT SERPL-MCNC: 6.6 G/DL (ref 6–8.5)
RBC # BLD AUTO: 5.69 10*6/MM3 (ref 4.14–5.8)
SODIUM SERPL-SCNC: 137 MMOL/L (ref 136–145)
WBC # BLD AUTO: 5.78 10*3/MM3 (ref 3.4–10.8)

## 2021-11-09 PROCEDURE — 80053 COMPREHEN METABOLIC PANEL: CPT

## 2021-11-09 PROCEDURE — G0463 HOSPITAL OUTPT CLINIC VISIT: HCPCS | Performed by: INTERNAL MEDICINE

## 2021-11-09 PROCEDURE — 99213 OFFICE O/P EST LOW 20 MIN: CPT | Performed by: INTERNAL MEDICINE

## 2021-11-09 PROCEDURE — 85025 COMPLETE CBC W/AUTO DIFF WBC: CPT

## 2021-11-09 RX ORDER — HYDROCODONE BITARTRATE AND ACETAMINOPHEN 10; 325 MG/1; MG/1
TABLET ORAL
COMMUNITY
Start: 2021-11-03

## 2021-11-09 NOTE — PROGRESS NOTES
Chief Complaint  Follow-up - lung cancer     Subjective          Vernon Nolen presents to [facility] HEMATOLOGY & ONCOLOGY  History of Present Illness     No new health issues since last visit.   No new medications.   No new hospitalization.   Feels well.       Objective   Vital Signs:   /84   Pulse 63   Temp 96.9 °F (36.1 °C)   Resp 18   Wt 56.3 kg (124 lb 1.6 oz)   SpO2 94%   BMI 17.32 kg/m²     Physical Exam  Vitals and nursing note reviewed.   Constitutional:       Appearance: Normal appearance.   Cardiovascular:      Rate and Rhythm: Normal rate and regular rhythm.   Pulmonary:      Effort: Pulmonary effort is normal.      Breath sounds: Wheezing present.   Neurological:      General: No focal deficit present.      Mental Status: He is alert and oriented to person, place, and time. Mental status is at baseline.   Psychiatric:         Mood and Affect: Mood normal.         Behavior: Behavior normal.        Result Review :   The following data was reviewed by: Josr Guevara MD on 11/09/2021:  Common labs    Common Labsle 8/9/21 8/9/21 9/22/21 9/22/21 11/9/21 11/9/21    0937 0937 1329 1329 1024 1024   Glucose  101 (A)    106 (A)   BUN  12 16   16   Creatinine  0.85  0.75 (A)  0.93   eGFR Non  Am  91  105  82   Sodium  132 (A)    137   Potassium  4.7    5.1   Chloride  98    100   Calcium  9.3    9.6   Albumin  4.20    4.50   Total Bilirubin  0.6    0.4   Alkaline Phosphatase  108    109   AST (SGOT)  24    14   ALT (SGPT)  24    12   WBC 6.39    5.78    Hemoglobin 17.7    16.8    Hematocrit 52.9 (A)    50.5    Platelets 276    252    (A) Abnormal value            CMP    CMP 8/9/21 9/22/21 9/22/21 11/9/21     1329 1329    Glucose 101 (A)   106 (A)   BUN 12 16  16   Creatinine 0.85  0.75 (A) 0.93   eGFR Non African Am 91  105 82   Sodium 132 (A)   137   Potassium 4.7   5.1   Chloride 98   100   Calcium 9.3   9.6   Albumin 4.20   4.50   Total Bilirubin 0.6    0.4   Alkaline Phosphatase 108   109   AST (SGOT) 24   14   ALT (SGPT) 24   12   (A) Abnormal value            CBC    CBC 3/19/21 8/9/21 11/9/21   WBC 5.74 6.39 5.78   RBC 5.70 6.07 (A) 5.69   Hemoglobin 17.2 17.7 16.8   Hematocrit 50.5 52.9 (A) 50.5   MCV 88.6 87.1 88.8   MCH 30.2 29.2 29.5   MCHC 34.1 33.5 33.3   RDW 14.6 14.4 14.2   Platelets 262 276 252   (A) Abnormal value            CBC w/diff    CBC w/Diff 3/19/21 8/9/21 11/9/21   WBC 5.74 6.39 5.78   RBC 5.70 6.07 (A) 5.69   Hemoglobin 17.2 17.7 16.8   Hematocrit 50.5 52.9 (A) 50.5   MCV 88.6 87.1 88.8   MCH 30.2 29.2 29.5   MCHC 34.1 33.5 33.3   RDW 14.6 14.4 14.2   Platelets 262 276 252   Neutrophil Rel % 62.1 64.6 66.0   Immature Granulocyte Rel % 0.3 0.3 0.3   Lymphocyte Rel % 25.1 23.8 24.9   Monocyte Rel % 8.9 8.6 7.4   Eosinophil Rel % 2.6 1.6 0.0 (A)   Basophil Rel % 1.0 1.1 1.4   (A) Abnormal value                Vernon Nolen reports a pain score of 7.  Given his pain assessment as noted, treatment options were discussed and the following options were decided upon as a follow-up plan to address the patient's pain: Patient follows with pain clinic..    Patient screened positive for depression based on a PHQ-9 score of 0 on 11/9/2021. Follow-up recommendations include: Depression screen is negative.    Oncology/Hematology History   Malignant neoplasm of lung (HCC)    Initial Diagnosis    Malignant neoplasm of lung (CMS/HCC)     5/7/2020 Biopsy    CT Guided Biopsy Left Lung mass:  Malignant cells are present consistent with adenocarcinoma.       5/21/2020 Imaging    PET/CT - Eun Lemus:  FDG-avid nodules in both lower lobes, the larger on the left.  The nodule at the left lung base is consistent with the known history of lung carcinoma.  The nodule at the right lung base likely represents a small metastatic focus.  A second primary lesion could have a similar appearance but would be less likely.  Correlation and continued follow-up are  recommended.     6/12/2020 Cancer Staged    Staging form: Lung, AJCC 8th Edition  - Clinical stage from 6/12/2020: Stage CELINE (cT1b, cN0, cM1a) - Signed by Josr Guevara MD on 6/12/2020 7/21/2020 - 8/5/2020 Radiation    Radiation OncologyTreatment Course:  Vernon Nolen received 5000 cGy in 4 fractions to right lower lobe of lung via stereotactic radiosurgery.  Patient also received 4800 cGy in 4 fractions to left lower lobe of lung.     11/4/2020 Imaging    CT Chest:  IMPRESSION:  1.38 x 1.41 x 1.72 cm irregular marginated solid neoplasm left  lung base laterally as detailed above. Comparison with prior  outside exams would be beneficial to evaluate for the stability  of this lesion. No suspicious appearing lymph nodes.     Background of pulmonary emphysema. No acute pulmonary or pleural  finding.           Assessment and Plan    Diagnoses and all orders for this visit:    1. Malignant neoplasm of overlapping sites of lung, unspecified laterality (HCC) (Primary)  -     CBC & Differential; Future  -     Comprehensive Metabolic Panel; Future  -     CT Chest With Contrast; Future  -     CBC & Differential; Future  -     Comprehensive Metabolic Panel; Future      Chronic, stable.  Treated with SBRT.  No clinical evidence of recurrence.  Continue monitoring.  I will order CBC, CMP, CT scan of chest in 3 months to make sure no clinical evidence of recurrence.      Follow Up   No follow-ups on file.  Patient was given instructions and counseling regarding his condition or for health maintenance advice. Please see specific information pulled into the AVS if appropriate.

## 2022-02-05 ENCOUNTER — TRANSCRIBE ORDERS (OUTPATIENT)
Dept: GENERAL RADIOLOGY | Facility: HOSPITAL | Age: 62
End: 2022-02-05

## 2022-02-05 DIAGNOSIS — C34.80 MALIGNANT NEOPLASM OF OVERLAPPING SITES OF LUNG, UNSPECIFIED LATERALITY: Primary | ICD-10-CM

## 2022-02-07 ENCOUNTER — TELEPHONE (OUTPATIENT)
Dept: NUTRITION | Facility: HOSPITAL | Age: 62
End: 2022-02-07

## 2022-02-11 ENCOUNTER — TELEPHONE (OUTPATIENT)
Dept: GENERAL RADIOLOGY | Facility: HOSPITAL | Age: 62
End: 2022-02-11

## 2022-02-14 ENCOUNTER — TELEPHONE (OUTPATIENT)
Dept: NUTRITION | Facility: HOSPITAL | Age: 62
End: 2022-02-14

## 2022-02-15 ENCOUNTER — OFFICE VISIT (OUTPATIENT)
Dept: ONCOLOGY | Facility: CLINIC | Age: 62
End: 2022-02-15

## 2022-02-15 ENCOUNTER — LAB (OUTPATIENT)
Dept: ONCOLOGY | Facility: HOSPITAL | Age: 62
End: 2022-02-15

## 2022-02-15 ENCOUNTER — DOCUMENTATION (OUTPATIENT)
Dept: NUTRITION | Facility: HOSPITAL | Age: 62
End: 2022-02-15

## 2022-02-15 VITALS
OXYGEN SATURATION: 98 % | TEMPERATURE: 97.5 F | RESPIRATION RATE: 18 BRPM | SYSTOLIC BLOOD PRESSURE: 147 MMHG | DIASTOLIC BLOOD PRESSURE: 84 MMHG | WEIGHT: 121.9 LBS | HEART RATE: 79 BPM | BODY MASS INDEX: 17.01 KG/M2

## 2022-02-15 DIAGNOSIS — C34.80 MALIGNANT NEOPLASM OF OVERLAPPING SITES OF LUNG, UNSPECIFIED LATERALITY: Primary | Chronic | ICD-10-CM

## 2022-02-15 DIAGNOSIS — C34.80 MALIGNANT NEOPLASM OF OVERLAPPING SITES OF LUNG, UNSPECIFIED LATERALITY: ICD-10-CM

## 2022-02-15 LAB
ALBUMIN SERPL-MCNC: 4.8 G/DL (ref 3.5–5.2)
ALBUMIN/GLOB SERPL: 2.4 G/DL
ALP SERPL-CCNC: 109 U/L (ref 39–117)
ALT SERPL W P-5'-P-CCNC: 18 U/L (ref 1–41)
ANION GAP SERPL CALCULATED.3IONS-SCNC: 8 MMOL/L (ref 5–15)
AST SERPL-CCNC: 18 U/L (ref 1–40)
BASOPHILS # BLD AUTO: 0.05 10*3/MM3 (ref 0–0.2)
BASOPHILS NFR BLD AUTO: 0.8 % (ref 0–1.5)
BILIRUB SERPL-MCNC: 0.6 MG/DL (ref 0–1.2)
BUN SERPL-MCNC: 13 MG/DL (ref 8–23)
BUN/CREAT SERPL: 15.9 (ref 7–25)
CALCIUM SPEC-SCNC: 9.8 MG/DL (ref 8.6–10.5)
CHLORIDE SERPL-SCNC: 104 MMOL/L (ref 98–107)
CO2 SERPL-SCNC: 31 MMOL/L (ref 22–29)
CREAT SERPL-MCNC: 0.82 MG/DL (ref 0.76–1.27)
DEPRECATED RDW RBC AUTO: 45.9 FL (ref 37–54)
EOSINOPHIL # BLD AUTO: 0.15 10*3/MM3 (ref 0–0.4)
EOSINOPHIL NFR BLD AUTO: 2.5 % (ref 0.3–6.2)
ERYTHROCYTE [DISTWIDTH] IN BLOOD BY AUTOMATED COUNT: 14.5 % (ref 12.3–15.4)
GFR SERPL CREATININE-BSD FRML MDRD: 95 ML/MIN/1.73
GLOBULIN UR ELPH-MCNC: 2 GM/DL
GLUCOSE SERPL-MCNC: 118 MG/DL (ref 65–99)
HCT VFR BLD AUTO: 48.5 % (ref 37.5–51)
HGB BLD-MCNC: 16.4 G/DL (ref 13–17.7)
HOLD SPECIMEN: NORMAL
IMM GRANULOCYTES # BLD AUTO: 0.03 10*3/MM3 (ref 0–0.05)
IMM GRANULOCYTES NFR BLD AUTO: 0.5 % (ref 0–0.5)
LYMPHOCYTES # BLD AUTO: 1.31 10*3/MM3 (ref 0.7–3.1)
LYMPHOCYTES NFR BLD AUTO: 22.2 % (ref 19.6–45.3)
MCH RBC QN AUTO: 29.5 PG (ref 26.6–33)
MCHC RBC AUTO-ENTMCNC: 33.8 G/DL (ref 31.5–35.7)
MCV RBC AUTO: 87.4 FL (ref 79–97)
MONOCYTES # BLD AUTO: 0.57 10*3/MM3 (ref 0.1–0.9)
MONOCYTES NFR BLD AUTO: 9.7 % (ref 5–12)
NEUTROPHILS NFR BLD AUTO: 3.79 10*3/MM3 (ref 1.7–7)
NEUTROPHILS NFR BLD AUTO: 64.3 % (ref 42.7–76)
NRBC BLD AUTO-RTO: 0 /100 WBC (ref 0–0.2)
PLATELET # BLD AUTO: 247 10*3/MM3 (ref 140–450)
PMV BLD AUTO: 9.9 FL (ref 6–12)
POTASSIUM SERPL-SCNC: 3.9 MMOL/L (ref 3.5–5.2)
PROT SERPL-MCNC: 6.8 G/DL (ref 6–8.5)
RBC # BLD AUTO: 5.55 10*6/MM3 (ref 4.14–5.8)
SODIUM SERPL-SCNC: 143 MMOL/L (ref 136–145)
WBC NRBC COR # BLD: 5.9 10*3/MM3 (ref 3.4–10.8)

## 2022-02-15 PROCEDURE — 80053 COMPREHEN METABOLIC PANEL: CPT

## 2022-02-15 PROCEDURE — G0463 HOSPITAL OUTPT CLINIC VISIT: HCPCS | Performed by: INTERNAL MEDICINE

## 2022-02-15 PROCEDURE — 99213 OFFICE O/P EST LOW 20 MIN: CPT | Performed by: INTERNAL MEDICINE

## 2022-02-15 PROCEDURE — 85025 COMPLETE CBC W/AUTO DIFF WBC: CPT

## 2022-02-15 NOTE — PROGRESS NOTES
"Adult Outpatient Nutrition  Assessment    Patient Name:  Vernon Nolen  YOB: 1960  MRN: [id number]    Assessment Date:  2/15/2022    Comments: Follow-up to check nutrition. Wt 121.9 lb--down from 124 lb/mon. Pt surprised by weight loss. Provided case of Boost Plus. Pt also tries to drink whole milk with meals (urged more often as desired). Explained to pt and sister that  staff has not been able to reach then via phone number on file. Entered pt's new cell phone number. Sister stated has same number, \"but people keep telling me that message says number disconnected\".                           Electronically signed by:  Mallika Caldwell RD  02/15/22 12:37 CST   "

## 2022-02-20 NOTE — PROGRESS NOTES
Chief Complaint  Follow-up - lung cancer     Subjective          Vrenon Nolen presents to [facility] HEMATOLOGY & ONCOLOGY  History of Present Illness     No new health issues since last visit.   No new medications.   No new hospitalization.   Feels well.     Oncology/Hematology History   Malignant neoplasm of lung (HCC)    Initial Diagnosis    Malignant neoplasm of lung (CMS/HCC)     5/7/2020 Biopsy    CT Guided Biopsy Left Lung mass:  Malignant cells are present consistent with adenocarcinoma.       5/21/2020 Imaging    PET/CT - Eun Lemus:  FDG-avid nodules in both lower lobes, the larger on the left.  The nodule at the left lung base is consistent with the known history of lung carcinoma.  The nodule at the right lung base likely represents a small metastatic focus.  A second primary lesion could have a similar appearance but would be less likely.  Correlation and continued follow-up are recommended.     6/12/2020 Cancer Staged    Staging form: Lung, AJCC 8th Edition  - Clinical stage from 6/12/2020: Stage CELINE (cT1b, cN0, cM1a) - Signed by Josr Guevara MD on 6/12/2020 7/21/2020 - 8/5/2020 Radiation    Radiation OncologyTreatment Course:  Vernon Nolen received 5000 cGy in 4 fractions to right lower lobe of lung via stereotactic radiosurgery.  Patient also received 4800 cGy in 4 fractions to left lower lobe of lung.     11/4/2020 Imaging    CT Chest:  IMPRESSION:  1.38 x 1.41 x 1.72 cm irregular marginated solid neoplasm left  lung base laterally as detailed above. Comparison with prior  outside exams would be beneficial to evaluate for the stability  of this lesion. No suspicious appearing lymph nodes.     Background of pulmonary emphysema. No acute pulmonary or pleural  finding.           Objective   Vital Signs:   /84   Pulse 79   Temp 97.5 °F (36.4 °C)   Resp 18   Wt 55.3 kg (121 lb 14.4 oz)   SpO2 98%   BMI 17.01 kg/m²     Physical Exam    Result Review :   The following data was reviewed by: Josr Guevara MD on 02/15/2022:  Common labs    Common Labsle 9/22/21 9/22/21 11/9/21 11/9/21 2/15/22 2/15/22    1329 1329 1024 1024 1030 1030   Glucose    106 (A)  118 (A)   BUN 16   16  13   Creatinine  0.75 (A)  0.93  0.82   eGFR Non  Am  105  82  95   Sodium    137  143   Potassium    5.1  3.9   Chloride    100  104   Calcium    9.6  9.8   Albumin    4.50  4.80   Total Bilirubin    0.4  0.6   Alkaline Phosphatase    109  109   AST (SGOT)    14  18   ALT (SGPT)    12  18   WBC   5.78  5.90    Hemoglobin   16.8  16.4    Hematocrit   50.5  48.5    Platelets   252  247    (A) Abnormal value            CMP    CMP 9/22/21 9/22/21 11/9/21 2/15/22    1329 1329     Glucose   106 (A) 118 (A)   BUN 16  16 13   Creatinine  0.75 (A) 0.93 0.82   eGFR Non African Am  105 82 95   Sodium   137 143   Potassium   5.1 3.9   Chloride   100 104   Calcium   9.6 9.8   Albumin   4.50 4.80   Total Bilirubin   0.4 0.6   Alkaline Phosphatase   109 109   AST (SGOT)   14 18   ALT (SGPT)   12 18   (A) Abnormal value            CBC    CBC 8/9/21 11/9/21 2/15/22   WBC 6.39 5.78 5.90   RBC 6.07 (A) 5.69 5.55   Hemoglobin 17.7 16.8 16.4   Hematocrit 52.9 (A) 50.5 48.5   MCV 87.1 88.8 87.4   MCH 29.2 29.5 29.5   MCHC 33.5 33.3 33.8   RDW 14.4 14.2 14.5   Platelets 276 252 247   (A) Abnormal value            CBC w/diff    CBC w/Diff 8/9/21 11/9/21 2/15/22   WBC 6.39 5.78 5.90   RBC 6.07 (A) 5.69 5.55   Hemoglobin 17.7 16.8 16.4   Hematocrit 52.9 (A) 50.5 48.5   MCV 87.1 88.8 87.4   MCH 29.2 29.5 29.5   MCHC 33.5 33.3 33.8   RDW 14.4 14.2 14.5   Platelets 276 252 247   Neutrophil Rel % 64.6 66.0 64.3   Immature Granulocyte Rel % 0.3 0.3 0.5   Lymphocyte Rel % 23.8 24.9 22.2   Monocyte Rel % 8.6 7.4 9.7   Eosinophil Rel % 1.6 0.0 (A) 2.5   Basophil Rel % 1.1 1.4 0.8   (A) Abnormal value              Vernon Viet Nolen reports a pain score of 0.  Given his pain assessment as noted,  treatment options were discussed and the following options were decided upon as a follow-up plan to address the patient's pain: continuation of current treatment plan for pain.    Patient screened positive for depression based on a PHQ-9 score of 0 on 2/15/2022. Follow-up recommendations include: Suicide Risk Assessment performed.      Assessment and Plan    Diagnoses and all orders for this visit:    1. Malignant neoplasm of overlapping sites of lung, unspecified laterality (HCC) (Primary)  -     CT Chest With Contrast; Future  -     CBC (No Diff); Future  -     Comprehensive Metabolic Panel; Future    Chronic, stable.   Treated with SBRT.  No clinical evidence of recurrence.  Continue monitoring.  He is due for CT scan. I will arrange for CT chest for lung cancer surveillance.   CBC, CMP in 3 months.       Follow Up   No follow-ups on file.  Patient was given instructions and counseling regarding his condition or for health maintenance advice. Please see specific information pulled into the AVS if appropriate.

## 2022-02-21 ENCOUNTER — HOSPITAL ENCOUNTER (OUTPATIENT)
Dept: CT IMAGING | Facility: HOSPITAL | Age: 62
Discharge: HOME | End: 2022-02-21
Admitting: INTERNAL MEDICINE

## 2022-02-21 DIAGNOSIS — C34.80 MALIGNANT NEOPLASM OF OVERLAPPING SITES OF LUNG, UNSPECIFIED LATERALITY: ICD-10-CM

## 2022-02-21 PROCEDURE — 71260 CT THORAX DX C+: CPT

## 2022-02-21 PROCEDURE — 25010000002 IOPAMIDOL 61 % SOLUTION: Performed by: INTERNAL MEDICINE

## 2022-02-21 RX ADMIN — IOPAMIDOL 75 ML: 612 INJECTION, SOLUTION INTRAVENOUS at 15:41

## 2022-03-11 ENCOUNTER — TELEPHONE (OUTPATIENT)
Dept: ONCOLOGY | Facility: CLINIC | Age: 62
End: 2022-03-11

## 2022-03-11 NOTE — TELEPHONE ENCOUNTER
----- Message from Manasa Cortez RN sent at 3/1/2022  2:36 PM CST -----  Attempted to contact both numbers available. No VM.

## 2022-08-15 ENCOUNTER — TELEPHONE (OUTPATIENT)
Dept: ONCOLOGY | Facility: CLINIC | Age: 62
End: 2022-08-15

## 2022-08-16 ENCOUNTER — TELEPHONE (OUTPATIENT)
Dept: ONCOLOGY | Facility: CLINIC | Age: 62
End: 2022-08-16

## 2022-08-16 NOTE — TELEPHONE ENCOUNTER
----- Message from Brianna Brambila sent at 8/16/2022  2:39 PM CDT -----  Regarding: FABIOLA.  PT NO SHOWED LAB/[facility] ON 5-17-22. PLEASE CALL TO FABIOLA.      ----- Message -----  From: SYSTEM  Sent: 8/3/2022  12:20 AM CDT  To: [facility]

## 2022-09-30 NOTE — TELEPHONE ENCOUNTER
Incoming Refill Request      Medication requested (name and dose):     Pharmacy where request should be sent:     Additional details provided by patient: N?A    Best call back number:     Does the patient have less than a 3 day supply:  [] Yes  [] No    Melva Hu  09/30/22, 15:58 CDT